# Patient Record
Sex: FEMALE | Race: ASIAN | NOT HISPANIC OR LATINO | Employment: FULL TIME | URBAN - METROPOLITAN AREA
[De-identification: names, ages, dates, MRNs, and addresses within clinical notes are randomized per-mention and may not be internally consistent; named-entity substitution may affect disease eponyms.]

---

## 2021-05-26 ENCOUNTER — RECORDS - HEALTHEAST (OUTPATIENT)
Dept: ADMINISTRATIVE | Facility: CLINIC | Age: 31
End: 2021-05-26

## 2023-07-12 ENCOUNTER — HOSPITAL ENCOUNTER (EMERGENCY)
Facility: CLINIC | Age: 33
Discharge: HOME | End: 2023-07-12
Attending: EMERGENCY MEDICINE | Admitting: EMERGENCY MEDICINE
Payer: COMMERCIAL

## 2023-07-12 VITALS
HEIGHT: 60 IN | DIASTOLIC BLOOD PRESSURE: 73 MMHG | WEIGHT: 230 LBS | OXYGEN SATURATION: 99 % | BODY MASS INDEX: 45.16 KG/M2 | RESPIRATION RATE: 28 BRPM | HEART RATE: 96 BPM | SYSTOLIC BLOOD PRESSURE: 128 MMHG | TEMPERATURE: 98.1 F

## 2023-07-12 DIAGNOSIS — R00.0 TACHYCARDIA: ICD-10-CM

## 2023-07-12 DIAGNOSIS — R00.2 PALPITATIONS: ICD-10-CM

## 2023-07-12 LAB
ALBUMIN SERPL-MCNC: 4.4 G/DL (ref 3.5–5)
ALBUMIN UR-MCNC: 30 MG/DL
ALP SERPL-CCNC: 74 U/L (ref 45–120)
ALT SERPL W P-5'-P-CCNC: 33 U/L (ref 0–45)
ANION GAP SERPL CALCULATED.3IONS-SCNC: 14 MMOL/L (ref 5–18)
APPEARANCE UR: CLEAR
AST SERPL W P-5'-P-CCNC: 24 U/L (ref 0–40)
BASOPHILS # BLD AUTO: 0.1 10E3/UL (ref 0–0.2)
BASOPHILS NFR BLD AUTO: 1 %
BILIRUB SERPL-MCNC: 0.6 MG/DL (ref 0–1)
BILIRUB UR QL STRIP: NEGATIVE
BUN SERPL-MCNC: 19 MG/DL (ref 8–22)
CALCIUM SERPL-MCNC: 10.4 MG/DL (ref 8.5–10.5)
CHLORIDE BLD-SCNC: 101 MMOL/L (ref 98–107)
CO2 SERPL-SCNC: 23 MMOL/L (ref 22–31)
COLOR UR AUTO: ABNORMAL
CREAT SERPL-MCNC: 0.76 MG/DL (ref 0.6–1.1)
D DIMER PPP FEU-MCNC: 0.3 UG/ML FEU (ref 0–0.5)
EOSINOPHIL # BLD AUTO: 0.3 10E3/UL (ref 0–0.7)
EOSINOPHIL NFR BLD AUTO: 2 %
ERYTHROCYTE [DISTWIDTH] IN BLOOD BY AUTOMATED COUNT: 14.5 % (ref 10–15)
FLUAV RNA SPEC QL NAA+PROBE: NEGATIVE
FLUBV RNA RESP QL NAA+PROBE: NEGATIVE
GFR SERPL CREATININE-BSD FRML MDRD: >90 ML/MIN/1.73M2
GLUCOSE BLD-MCNC: 85 MG/DL (ref 70–125)
GLUCOSE UR STRIP-MCNC: NEGATIVE MG/DL
HCG UR QL: NEGATIVE
HCT VFR BLD AUTO: 45 % (ref 35–47)
HGB BLD-MCNC: 14.8 G/DL (ref 11.7–15.7)
HGB UR QL STRIP: ABNORMAL
IMM GRANULOCYTES # BLD: 0.1 10E3/UL
IMM GRANULOCYTES NFR BLD: 1 %
KETONES UR STRIP-MCNC: NEGATIVE MG/DL
LEUKOCYTE ESTERASE UR QL STRIP: ABNORMAL
LYMPHOCYTES # BLD AUTO: 3 10E3/UL (ref 0.8–5.3)
LYMPHOCYTES NFR BLD AUTO: 24 %
MAGNESIUM SERPL-MCNC: 1.6 MG/DL (ref 1.8–2.6)
MCH RBC QN AUTO: 26.3 PG (ref 26.5–33)
MCHC RBC AUTO-ENTMCNC: 32.9 G/DL (ref 31.5–36.5)
MCV RBC AUTO: 80 FL (ref 78–100)
MONOCYTES # BLD AUTO: 0.8 10E3/UL (ref 0–1.3)
MONOCYTES NFR BLD AUTO: 6 %
MUCOUS THREADS #/AREA URNS LPF: PRESENT /LPF
NEUTROPHILS # BLD AUTO: 8.3 10E3/UL (ref 1.6–8.3)
NEUTROPHILS NFR BLD AUTO: 66 %
NITRATE UR QL: NEGATIVE
NRBC # BLD AUTO: 0 10E3/UL
NRBC BLD AUTO-RTO: 0 /100
PH UR STRIP: 5.5 [PH] (ref 5–7)
PLATELET # BLD AUTO: 301 10E3/UL (ref 150–450)
POTASSIUM BLD-SCNC: 3.9 MMOL/L (ref 3.5–5)
PROT SERPL-MCNC: 8.8 G/DL (ref 6–8)
RBC # BLD AUTO: 5.62 10E6/UL (ref 3.8–5.2)
RBC URINE: 1 /HPF
RSV RNA SPEC NAA+PROBE: NEGATIVE
SARS-COV-2 RNA RESP QL NAA+PROBE: NEGATIVE
SODIUM SERPL-SCNC: 138 MMOL/L (ref 136–145)
SP GR UR STRIP: 1.02 (ref 1–1.03)
SQUAMOUS EPITHELIAL: 3 /HPF
TROPONIN I SERPL-MCNC: 0.03 NG/ML (ref 0–0.29)
TSH SERPL DL<=0.005 MIU/L-ACNC: 3.64 UIU/ML (ref 0.3–5)
UROBILINOGEN UR STRIP-MCNC: <2 MG/DL
WBC # BLD AUTO: 12.5 10E3/UL (ref 4–11)
WBC URINE: 9 /HPF

## 2023-07-12 PROCEDURE — 84484 ASSAY OF TROPONIN QUANT: CPT | Performed by: EMERGENCY MEDICINE

## 2023-07-12 PROCEDURE — 80053 COMPREHEN METABOLIC PANEL: CPT | Performed by: EMERGENCY MEDICINE

## 2023-07-12 PROCEDURE — 81025 URINE PREGNANCY TEST: CPT | Performed by: EMERGENCY MEDICINE

## 2023-07-12 PROCEDURE — 96360 HYDRATION IV INFUSION INIT: CPT

## 2023-07-12 PROCEDURE — 93005 ELECTROCARDIOGRAM TRACING: CPT | Performed by: EMERGENCY MEDICINE

## 2023-07-12 PROCEDURE — 84443 ASSAY THYROID STIM HORMONE: CPT | Performed by: EMERGENCY MEDICINE

## 2023-07-12 PROCEDURE — 36415 COLL VENOUS BLD VENIPUNCTURE: CPT | Performed by: EMERGENCY MEDICINE

## 2023-07-12 PROCEDURE — 83735 ASSAY OF MAGNESIUM: CPT | Performed by: EMERGENCY MEDICINE

## 2023-07-12 PROCEDURE — 258N000003 HC RX IP 258 OP 636: Performed by: EMERGENCY MEDICINE

## 2023-07-12 PROCEDURE — 87637 SARSCOV2&INF A&B&RSV AMP PRB: CPT | Performed by: EMERGENCY MEDICINE

## 2023-07-12 PROCEDURE — 81001 URINALYSIS AUTO W/SCOPE: CPT | Performed by: EMERGENCY MEDICINE

## 2023-07-12 PROCEDURE — 85025 COMPLETE CBC W/AUTO DIFF WBC: CPT | Performed by: EMERGENCY MEDICINE

## 2023-07-12 PROCEDURE — 99284 EMERGENCY DEPT VISIT MOD MDM: CPT | Mod: 25

## 2023-07-12 PROCEDURE — 85379 FIBRIN DEGRADATION QUANT: CPT | Performed by: EMERGENCY MEDICINE

## 2023-07-12 PROCEDURE — 96361 HYDRATE IV INFUSION ADD-ON: CPT

## 2023-07-12 RX ADMIN — SODIUM CHLORIDE 1000 ML: 9 INJECTION, SOLUTION INTRAVENOUS at 20:48

## 2023-07-12 RX ADMIN — SODIUM CHLORIDE 1000 ML: 9 INJECTION, SOLUTION INTRAVENOUS at 20:15

## 2023-07-12 ASSESSMENT — ACTIVITIES OF DAILY LIVING (ADL): ADLS_ACUITY_SCORE: 35

## 2023-07-12 NOTE — ED TRIAGE NOTES
Pt was seen in clinic today for a rapid HR for the last couple of days with some headache and dizziness. Had EKG in clinic showing ST but was told to come to ED for further eval.     Triage Assessment     Row Name 07/12/23 6114       Triage Assessment (Adult)    Airway WDL WDL       Respiratory WDL    Respiratory WDL WDL       Skin Circulation/Temperature WDL    Skin Circulation/Temperature WDL WDL       Cardiac WDL    Cardiac WDL X;rhythm    Pulse Rate & Regularity tachycardic       Peripheral/Neurovascular WDL    Peripheral Neurovascular WDL WDL       Cognitive/Neuro/Behavioral WDL    Cognitive/Neuro/Behavioral WDL WDL

## 2023-07-13 LAB
ATRIAL RATE - MUSE: 111 BPM
DIASTOLIC BLOOD PRESSURE - MUSE: NORMAL MMHG
INTERPRETATION ECG - MUSE: NORMAL
P AXIS - MUSE: 43 DEGREES
PR INTERVAL - MUSE: 136 MS
QRS DURATION - MUSE: 94 MS
QT - MUSE: 362 MS
QTC - MUSE: 492 MS
R AXIS - MUSE: 32 DEGREES
SYSTOLIC BLOOD PRESSURE - MUSE: NORMAL MMHG
T AXIS - MUSE: 2 DEGREES
VENTRICULAR RATE- MUSE: 111 BPM

## 2023-07-13 NOTE — DISCHARGE INSTRUCTIONS
You were seen in the emergency department for rapid heart rate and palpitations.  Your evaluation included lab testing here which has looked generally stable and reassuring.  Specifically we did not see any signs of blood clots, electrolyte problems, heart attack or arrhythmia.  We did note some improvement in your heart rate with IV fluids here and would recommend continuing to try to stay well-hydrated at home and drink at least 2 to 3 L of water or other balanced electrolyte solutions like sugar-free Gatorade to stay well-hydrated.  We would like you to follow-up in your clinic to continue reviewing things with your primary provider and discuss any additional work-up needed for persistent symptoms after this ED visit.

## 2023-07-13 NOTE — ED PROVIDER NOTES
EMERGENCY DEPARTMENT ENCOUNTER      NAME: Katina Montilla  AGE: 33 year old female  YOB: 1990  MRN: [id number]  EVALUATION DATE & TIME: 2023  7:12 PM    PCP: Jacqui Dc    ED PROVIDER: Avinash Gutierrez M.D.      Chief Complaint   Patient presents with     Tachycardia         FINAL IMPRESSION:  1. Tachycardia    2. Palpitations          ED COURSE & MEDICAL DECISION MAKIN:01 PM I met with the patient to obtain patient history and performed a physical exam. Discussed plan for ED work up including potential diagnostic studies and interventions.  9:33 PM Reevaluated and updated the patient with findings. We discussed the plan for discharge and the patient is agreeable. Reviewed supportive cares, symptomatic treatment, outpatient follow up, and reasons to return to the Emergency Department. Patient to be discharged by ED RN.     33 year old female presents to the Emergency Department for evaluation of tachycardia, palpitations.  She is mildly tachycardic but otherwise vitally stable when she arrives to the emergency department.  She has an EKG which confirms sinus rhythm.  Broad lab work-up was initiated as below.  This included a reassuringly normal D-dimer ruling out pulmonary embolism.  Infectious work-up was undertaken which did reveal a mild leukocytosis but otherwise she does not have any respiratory complaints, has a negative COVID test, no convincing evidence for UTI with minimal pyuria on a contaminated specimen and no urinary symptoms.  She does not have any abdominal pain complaints vomiting or other acute infectious symptoms.  Remainder of her work-up including metabolic profile, TSH are unrevealing.  Patient received some IV fluids and her heart rate subsequently improved modestly and now is within the normal range.  We discussed increasing liquid intake and other supportive measures for home but at this point I do not think would require hospitalization.  Clinic follow-up was  "advised within 1 to 2 weeks especially if any persistent concerns.  Return precautions were reviewed and patient was discharged in stable condition.      Medical Decision Making    History:    Supplemental history from: Documented in chart, if applicable    External Record(s) reviewed: Documented in chart, if applicable.    Work Up:    Chart documentation includes differential considered and any EKGs or imaging independently interpreted by provider, where specified.    In additional to work up documented, I considered the following work up: Documented in chart, if applicable.    External consultation:    Discussion of management with another provider: Documented in chart, if applicable    Complicating factors:    Care impacted by chronic illness: Hypertension    Care affected by social determinants of health: N/A    Disposition considerations: Discharge. No recommendations on prescription strength medication(s). See documentation for any additional details.            MEDICATIONS GIVEN IN THE EMERGENCY:  Medications   0.9% sodium chloride BOLUS (0 mLs Intravenous Stopped 7/12/23 2134)   0.9% sodium chloride BOLUS (0 mLs Intravenous Stopped 7/12/23 2134)       NEW PRESCRIPTIONS STARTED AT TODAY'S ER VISIT  There are no discharge medications for this patient.         =================================================================    HPI    Patient information was obtained from: patient    Use of : N/A        Katina Montilla is a 33 year old female with a pertinent history of HTN who presents to this ED via self walk-in for evaluation of palpitations.    Patient reports she developed some persistent palpitations yesterday. She describes the palpitations as her heart \"adding a beat.\" Today, the palpitations have been constant so she decided to go to  to be evaluated.  did an ECG on her and recommended she come into ED to be evaluated. She associates some light headedness and dizziness currently. She notes she " "had a \"bad\" headache yesterday which has now resolved after taking 2 tablets of Excedrin yesterday.  also notes she was \"warm yesterday.\"    She otherwise denies associating changes in activity, fever, chest pain, abdominal pain, cough, shortness of breath, dysuria, hematuria, and difficulty urinating. She is currently taking her HTN medications. She endorses history of echocardiogram and says she had \"2 acute leaky valves\" then.    There were no other concerns/complaints at this time.     REVIEW OF SYSTEMS   All systems reviewed and negative except as noted in HPI.    PAST MEDICAL HISTORY:  History reviewed. No pertinent past medical history.    PAST SURGICAL HISTORY:  History reviewed. No pertinent surgical history.        CURRENT MEDICATIONS:    No current facility-administered medications for this encounter.     No current outpatient medications on file.         ALLERGIES:  No Known Allergies    FAMILY HISTORY:  History reviewed. No pertinent family history.    SOCIAL HISTORY:   Social History     Socioeconomic History     Marital status: Single       VITALS:  /73   Pulse 96   Temp 98.1  F (36.7  C) (Oral)   Resp 28   Ht 1.524 m (5')   Wt 104.3 kg (230 lb)   LMP 04/03/2023   SpO2 99%   BMI 44.92 kg/m      PHYSICAL EXAM    Constitutional: Well developed, Well nourished, NAD.  HENT: Normocephalic, Atraumatic. Neck Supple.  Eyes: EOMI, Conjunctiva normal.  Respiratory: Breathing comfortably on room air. Speaks full sentences easily. Lungs clear to ascultation.  Cardiovascular: Borderline tachycardic heart rate, Regular rhythm. No peripheral edema.  Abdomen: Soft, nontender  Musculoskeletal: Good range of motion in all major joints. No major deformities noted.  Integument: Warm, Dry.  Neurologic: Alert & awake, Normal motor function, Normal sensory function, No focal deficits noted.   Psychiatric: Cooperative. Affect appropriate.     LAB:  All pertinent labs reviewed and interpreted.  Labs " Ordered and Resulted from Time of ED Arrival to Time of ED Departure   COMPREHENSIVE METABOLIC PANEL - Abnormal       Result Value    Sodium 138      Potassium 3.9      Chloride 101      Carbon Dioxide (CO2) 23      Anion Gap 14      Urea Nitrogen 19      Creatinine 0.76      Calcium 10.4      Glucose 85      Alkaline Phosphatase 74      AST 24      ALT 33      Protein Total 8.8 (*)     Albumin 4.4      Bilirubin Total 0.6      GFR Estimate >90     MAGNESIUM - Abnormal    Magnesium 1.6 (*)    CBC WITH PLATELETS AND DIFFERENTIAL - Abnormal    WBC Count 12.5 (*)     RBC Count 5.62 (*)     Hemoglobin 14.8      Hematocrit 45.0      MCV 80      MCH 26.3 (*)     MCHC 32.9      RDW 14.5      Platelet Count 301      % Neutrophils 66      % Lymphocytes 24      % Monocytes 6      % Eosinophils 2      % Basophils 1      % Immature Granulocytes 1      NRBCs per 100 WBC 0      Absolute Neutrophils 8.3      Absolute Lymphocytes 3.0      Absolute Monocytes 0.8      Absolute Eosinophils 0.3      Absolute Basophils 0.1      Absolute Immature Granulocytes 0.1      Absolute NRBCs 0.0     ROUTINE UA WITH MICROSCOPIC REFLEX TO CULTURE - Abnormal    Color Urine Light Yellow      Appearance Urine Clear      Glucose Urine Negative      Bilirubin Urine Negative      Ketones Urine Negative      Specific Gravity Urine 1.017      Blood Urine 0.06 mg/dL (*)     pH Urine 5.5      Protein Albumin Urine 30 (*)     Urobilinogen Urine <2.0      Nitrite Urine Negative      Leukocyte Esterase Urine 75 Luciano/uL (*)     Mucus Urine Present (*)     RBC Urine 1      WBC Urine 9 (*)     Squamous Epithelials Urine 3 (*)    TROPONIN I - Normal    Troponin I 0.03     TSH WITH FREE T4 REFLEX - Normal    TSH 3.64     D DIMER QUANTITATIVE - Normal    D-Dimer Quantitative 0.30     HCG QUALITATIVE URINE - Normal    hCG Urine Qualitative Negative     INFLUENZA A/B, RSV, & SARS-COV2 PCR - Normal    Influenza A PCR Negative      Influenza B PCR Negative      RSV PCR  Negative      SARS CoV2 PCR Negative         RADIOLOGY:  Reviewed all pertinent imaging. Please see official radiology report.  No orders to display       EKG:    Performed at: 1758    Impression: Sinus tachycardia, possible left atrial enlargement, borderline voltage criteria for LVH and nonspecific repolarization disturbance    Rate: 111  Rhythm: Sinus  Axis: Normal  ND Interval: 136  QRS Interval: 94  QTc Interval: 492  ST Changes: Nonspecific ST-T wave abnormality  Comparison: None available for direct comparison    I have independently reviewed and interpreted the EKG(s) documented above.      I, Migdalai Preciado, am serving as a scribe to document services personally performed by Dr. Avinash Gutierrez, based on my observation and the provider's statements to me. I, Avinash Gutierrez MD attest that Migdalia Preciado is acting in a scribe capacity, has observed my performance of the services and has documented them in accordance with my direction.    Avinash Gutierrez M.D.  Emergency Medicine  [facility] EMERGENCY ROOM  [address] [id number] [phone]  Dept: [phone]     Avinash Gutierrez MD  07/13/23 2277

## 2024-01-08 ENCOUNTER — HOSPITAL ENCOUNTER (EMERGENCY)
Facility: HOSPITAL | Age: 34
Discharge: HOME | End: 2024-01-08
Attending: EMERGENCY MEDICINE | Admitting: EMERGENCY MEDICINE
Payer: COMMERCIAL

## 2024-01-08 VITALS
HEIGHT: 60 IN | TEMPERATURE: 98.3 F | WEIGHT: 232 LBS | BODY MASS INDEX: 45.55 KG/M2 | OXYGEN SATURATION: 98 % | RESPIRATION RATE: 26 BRPM | HEART RATE: 85 BPM | DIASTOLIC BLOOD PRESSURE: 74 MMHG | SYSTOLIC BLOOD PRESSURE: 133 MMHG

## 2024-01-08 DIAGNOSIS — R06.00 DYSPNEA, UNSPECIFIED TYPE: ICD-10-CM

## 2024-01-08 DIAGNOSIS — Z32.01 PREGNANCY TEST POSITIVE: ICD-10-CM

## 2024-01-08 LAB
ANION GAP SERPL CALCULATED.3IONS-SCNC: 12 MMOL/L (ref 7–15)
ATRIAL RATE - MUSE: 97 BPM
BUN SERPL-MCNC: 10.3 MG/DL (ref 6–20)
CALCIUM SERPL-MCNC: 9.1 MG/DL (ref 8.6–10)
CHLORIDE SERPL-SCNC: 102 MMOL/L (ref 98–107)
CREAT SERPL-MCNC: 0.56 MG/DL (ref 0.51–0.95)
DEPRECATED HCO3 PLAS-SCNC: 23 MMOL/L (ref 22–29)
DIASTOLIC BLOOD PRESSURE - MUSE: 85 MMHG
EGFRCR SERPLBLD CKD-EPI 2021: >90 ML/MIN/1.73M2
ERYTHROCYTE [DISTWIDTH] IN BLOOD BY AUTOMATED COUNT: 13.6 % (ref 10–15)
FLUAV RNA SPEC QL NAA+PROBE: NEGATIVE
FLUBV RNA RESP QL NAA+PROBE: NEGATIVE
GLUCOSE SERPL-MCNC: 108 MG/DL (ref 70–99)
HCG INTACT+B SERPL-ACNC: 7018 MIU/ML
HCT VFR BLD AUTO: 40.8 % (ref 35–47)
HGB BLD-MCNC: 13.6 G/DL (ref 11.7–15.7)
HOLD SPECIMEN: NORMAL
INTERPRETATION ECG - MUSE: NORMAL
MCH RBC QN AUTO: 27.4 PG (ref 26.5–33)
MCHC RBC AUTO-ENTMCNC: 33.3 G/DL (ref 31.5–36.5)
MCV RBC AUTO: 82 FL (ref 78–100)
P AXIS - MUSE: 59 DEGREES
PLATELET # BLD AUTO: 252 10E3/UL (ref 150–450)
POTASSIUM SERPL-SCNC: 3.4 MMOL/L (ref 3.4–5.3)
PR INTERVAL - MUSE: 134 MS
QRS DURATION - MUSE: 94 MS
QT - MUSE: 392 MS
QTC - MUSE: 497 MS
R AXIS - MUSE: 70 DEGREES
RBC # BLD AUTO: 4.97 10E6/UL (ref 3.8–5.2)
RSV RNA SPEC NAA+PROBE: NEGATIVE
SARS-COV-2 RNA RESP QL NAA+PROBE: NEGATIVE
SODIUM SERPL-SCNC: 137 MMOL/L (ref 135–145)
SYSTOLIC BLOOD PRESSURE - MUSE: 133 MMHG
T AXIS - MUSE: 30 DEGREES
VENTRICULAR RATE- MUSE: 97 BPM
WBC # BLD AUTO: 8.9 10E3/UL (ref 4–11)

## 2024-01-08 PROCEDURE — 85014 HEMATOCRIT: CPT | Performed by: EMERGENCY MEDICINE

## 2024-01-08 PROCEDURE — 84702 CHORIONIC GONADOTROPIN TEST: CPT | Performed by: EMERGENCY MEDICINE

## 2024-01-08 PROCEDURE — 36415 COLL VENOUS BLD VENIPUNCTURE: CPT | Performed by: EMERGENCY MEDICINE

## 2024-01-08 PROCEDURE — 80048 BASIC METABOLIC PNL TOTAL CA: CPT | Performed by: EMERGENCY MEDICINE

## 2024-01-08 PROCEDURE — 87637 SARSCOV2&INF A&B&RSV AMP PRB: CPT | Performed by: EMERGENCY MEDICINE

## 2024-01-08 PROCEDURE — 99284 EMERGENCY DEPT VISIT MOD MDM: CPT

## 2024-01-08 PROCEDURE — 93005 ELECTROCARDIOGRAM TRACING: CPT | Performed by: STUDENT IN AN ORGANIZED HEALTH CARE EDUCATION/TRAINING PROGRAM

## 2024-01-08 ASSESSMENT — ACTIVITIES OF DAILY LIVING (ADL): ADLS_ACUITY_SCORE: 35

## 2024-01-08 NOTE — ED TRIAGE NOTES
Patient in here with multiple complaints c/o shortness of breath , chest pain last night, headaches , feeling tired, light head and lower abdominal pain.  Patient stated tested positive pregnancy 1/6/2024.  Hx; HTN, pre diabetis, Asthma.  Patient did not taking Bp med this morning.  Patient had bloody stool mid   December and spotting.      Triage Assessment (Adult)       Row Name 01/08/24 0943          Triage Assessment    Airway WDL WDL        Respiratory WDL    Respiratory WDL all     Rhythm/Pattern, Respiratory shortness of breath        Skin Circulation/Temperature WDL    Skin Circulation/Temperature WDL WDL        Peripheral/Neurovascular WDL    Peripheral Neurovascular WDL WDL        Cognitive/Neuro/Behavioral WDL    Cognitive/Neuro/Behavioral WDL WDL

## 2024-01-08 NOTE — ED PROVIDER NOTES
EMERGENCY DEPARTMENT ENCOUNTER      NAME: Katina Montilla  AGE: 33 year old female  YOB: 1990  MRN: [id number]  EVALUATION DATE & TIME: 1/8/2024  9:49 AM    PCP: Jacqui Dc    ED PROVIDER: Blane Cortez M.D.      Chief Complaint   Patient presents with    Chest Pain    Shortness of Breath    Abdominal Pain         FINAL IMPRESSION:  Dyspnea  Pregnancy    ED COURSE & MEDICAL DECISION MAKING:    Pertinent Labs & Imaging studies reviewed. (See chart for details)  33 year old female presents to the Emergency Department for evaluation of shortness of breath.  States been ongoing intermittently for a number of days.  Has been using her inhaler rarely without improvement.  Is concerned about possibility of complications of pregnancy.  Patient with very irregular menstrual cycles.  Was testing for pregnancy in November and December is negative.  Did test positive a few days ago.  Patient denies any fevers chills.  Denies any cough.  Does take metformin for elevated blood sugars but does not check her sugars.  On exam she is an obese female mild distress.  Vital signs unremarkable.  Oxygenation excellent.  Lungs clear.  No wheezing or rhonchi.  Card exam unremarkable.  Abdomen soft obese nontender.  Patient with potential issues with hyperglycemia, electrolyte imbalance given metformin use and absence of blood sugar testing.  The possibility of RSV/COVID/influenza also considered.  Will obtain quantitative beta-hCG also to stage her pregnancy.  No indications for imaging at this point.. Patient appears non toxic with stable vitals signs. Overall exam is benign.          10:25 AM.  I met with the patient for the initial interview and physical examination. Discussed plan for treatment and workup in the ED.    12 PM.  Laboratory evaluation reassuring.  Electrolytes essentially normal.  Minimal hyperglycemia glucose of 108.  CBC unremarkable.  Quantitative hCG elevated at 7018 consistent with her pregnancy.   Swab negative for RSV/COVID/influenza.  Patient reassured in regards to finding.  Encouraged to establish routine OB/GYN care.  Caution to avoid cold as this could exacerbate her asthma.  At the conclusion of the encounter I discussed the results of all of the tests and the disposition. The questions were answered and return precautions provided. The patient or family acknowledged understanding and was agreeable with the care plan.       PPE: Provider paper mask  MEDICATIONS GIVEN IN THE EMERGENCY:  Medications - No data to display    NEW PRESCRIPTIONS STARTED AT TODAY'S ER VISIT  New Prescriptions    No medications on file          =================================================================    HPI        Katina Montilla is a 33 year old female with a pertient medical history of obesity, irregular menstrual cycle, hyperglycemia who presents to the ED for evaluation of dyspnea.  Symptoms ongoing for the last several days.  Does not appear to be related to activities.  Has been using her inhaler without obvious improvement.  No obvious fevers.  Minimal cough.  No other family members ill.  Patient with irregular menstrual cycles and did have a recent positive pregnancy test.  Reports negative pregnancy test in both November and December.      REVIEW OF SYSTEMS   Constitutional:  Denies fever, chills  Respiratory: Minimal cough and increased work of breathing  Cardiovascular:  Denies chest pain, palpitations  GI:  Denies abdominal pain, nausea, vomiting, or change in bowel or bladder habits   Musculoskeletal:  Denies any new muscle/joint swelling  Skin:  Denies rash   Neurologic:  Denies focal weakness  All systems negative except as marked.     PAST MEDICAL HISTORY:  History reviewed. No pertinent past medical history.    PAST SURGICAL HISTORY:  History reviewed. No pertinent surgical history.      CURRENT MEDICATIONS:    No current facility-administered medications for this encounter.  No current outpatient  medications on file.    ALLERGIES:  No Known Allergies    FAMILY HISTORY:  History reviewed. No pertinent family history.    SOCIAL HISTORY:   Social History     Socioeconomic History    Marital status: Single     Spouse name: None    Number of children: None    Years of education: None    Highest education level: None       VITALS:  Patient Vitals for the past 24 hrs:   BP Temp Temp src Pulse Resp SpO2 Height Weight   01/08/24 1015 127/83 -- -- 91 27 98 % -- --   01/08/24 1000 133/85 -- -- 91 28 99 % -- --   01/08/24 0952 132/84 -- -- -- -- -- -- --   01/08/24 0948 (!) 193/112 -- -- -- -- -- -- --   01/08/24 0939 (!) 154/87 98.3  F (36.8  C) Oral 96 18 99 % 1.524 m (5') 105.2 kg (232 lb)        PHYSICAL EXAM    Constitutional:  Awake, alert, in minimal apparent distress  HENT:  Normocephalic, Atraumatic. Bilateral external ears normal. Oropharynx moist. Nose normal. Neck- Normal range of motion with no guarding,  Supple, No stridor.   Eyes:  PERRL, EOMI with no signs of entrapment, Conjunctiva normal, No discharge.   Respiratory:  Normal breath sounds, No respiratory distress, No wheezing.    Cardiovascular:  Normal heart rate, Normal rhythm, No appreciable rubs or gallops.   GI:  Soft, No tenderness, No distension, No palpable masses  Musculoskeletal:No edema. Good range of motion in all major joints. No tenderness to palpation or major deformities noted.  Integument:  Warm, Dry, No erythema, No rash.   Neurologic:  Alert & oriented, Normal motor function, Normal sensory function, No focal deficits noted.   Psychiatric:  Affect normal, Judgment normal, Mood normal.     LAB:  All pertinent labs reviewed and interpreted.     Results for orders placed or performed during the hospital encounter of 01/08/24   [facility] Draw     Status: None    Narrative    The following orders were created for panel order [facility] Draw.  Procedure                               Abnormality         Status                     ---------                                -----------         ------                     Extra Blue Top Tube[[id number]]                              Final result               Extra Red Top Tube[[id number]]                               Final result               Extra Green Top (Lithium...[[id number]]                      Final result               Extra Purple Top Tube[[id number]]                            Final result               Extra Purple Top Tube[[id number]]                            Final result                 Please view results for these tests on the individual orders.   Extra Blue Top Tube     Status: None   Result Value Ref Range    Hold Specimen JIC    Extra Red Top Tube     Status: None   Result Value Ref Range    Hold Specimen JIC    Extra Green Top (Lithium Heparin) Tube     Status: None   Result Value Ref Range    Hold Specimen JIC    Extra Purple Top Tube     Status: None   Result Value Ref Range    Hold Specimen JIC    Extra Purple Top Tube     Status: None   Result Value Ref Range    Hold Specimen JIC    Basic metabolic panel     Status: Abnormal   Result Value Ref Range    Sodium 137 135 - 145 mmol/L    Potassium 3.4 3.4 - 5.3 mmol/L    Chloride 102 98 - 107 mmol/L    Carbon Dioxide (CO2) 23 22 - 29 mmol/L    Anion Gap 12 7 - 15 mmol/L    Urea Nitrogen 10.3 6.0 - 20.0 mg/dL    Creatinine 0.56 0.51 - 0.95 mg/dL    GFR Estimate >90 >60 mL/min/1.73m2    Calcium 9.1 8.6 - 10.0 mg/dL    Glucose 108 (H) 70 - 99 mg/dL   CBC (+ platelets, no diff)     Status: Normal   Result Value Ref Range    WBC Count 8.9 4.0 - 11.0 10e3/uL    RBC Count 4.97 3.80 - 5.20 10e6/uL    Hemoglobin 13.6 11.7 - 15.7 g/dL    Hematocrit 40.8 35.0 - 47.0 %    MCV 82 78 - 100 fL    MCH 27.4 26.5 - 33.0 pg    MCHC 33.3 31.5 - 36.5 g/dL    RDW 13.6 10.0 - 15.0 %    Platelet Count 252 150 - 450 10e3/uL   HCG quantitative pregnancy (blood)     Status: Abnormal   Result Value Ref Range    hCG Quantitative 7,018 (H) <5 mIU/mL   Symptomatic Influenza  A/B, RSV, & SARS-CoV2 PCR (COVID-19) Nasopharyngeal     Status: Normal    Specimen: Nasopharyngeal; Swab   Result Value Ref Range    Influenza A PCR Negative Negative    Influenza B PCR Negative Negative    RSV PCR Negative Negative    SARS CoV2 PCR Negative Negative    Narrative    Testing was performed using the Xpert Xpress CoV2/Flu/RSV Assay on the [employer] GeneXpert Instrument. This test should be ordered for the detection of SARS-CoV-2, influenza, and RSV viruses in individuals who meet clinical and/or epidemiological criteria. Test performance is unknown in asymptomatic patients. This test is for in vitro diagnostic use under the FDA EUA for laboratories certified under CLIA to perform high or moderate complexity testing. This test has not been FDA cleared or approved. A negative result does not rule out the presence of PCR inhibitors in the specimen or target RNA in concentration below the limit of detection for the assay. If only one viral target is positive but coinfection with multiple targets is suspected, the sample should be re-tested with another FDA cleared, approved, or authorized test, if coinfection would change clinical management. This test was validated by the [facility]. These laboratories are certified under the Clinical Laboratory Improvement Amendments of 1988 (CLIA-88) as qualified to perform high complexity laboratory testing.        ECG: Normal sinus rhythm.  Rate of 97.  Prolonged QT at 392 ms.  Normal QRS.  Normal ST segments.  Unchanged compared to July 12, 2023    I have independently reviewed and interpreted the EKG    Blane Cortez M.D.  Emergency Medicine  [facility] EMERGENCY DEPARTMENT     Blane Cortez MD  01/08/24 1203       Blane Cortez MD  01/08/24 1228

## 2024-04-01 ENCOUNTER — HOSPITAL ENCOUNTER (EMERGENCY)
Facility: HOSPITAL | Age: 34
Discharge: HOME | End: 2024-04-01
Attending: STUDENT IN AN ORGANIZED HEALTH CARE EDUCATION/TRAINING PROGRAM | Admitting: STUDENT IN AN ORGANIZED HEALTH CARE EDUCATION/TRAINING PROGRAM
Payer: COMMERCIAL

## 2024-04-01 VITALS
RESPIRATION RATE: 18 BRPM | HEART RATE: 92 BPM | WEIGHT: 238 LBS | SYSTOLIC BLOOD PRESSURE: 147 MMHG | OXYGEN SATURATION: 96 % | BODY MASS INDEX: 46.48 KG/M2 | DIASTOLIC BLOOD PRESSURE: 70 MMHG | TEMPERATURE: 97.9 F

## 2024-04-01 DIAGNOSIS — R51.9 NONINTRACTABLE HEADACHE, UNSPECIFIED CHRONICITY PATTERN, UNSPECIFIED HEADACHE TYPE: ICD-10-CM

## 2024-04-01 LAB
ALBUMIN SERPL BCG-MCNC: 3.8 G/DL (ref 3.5–5.2)
ALBUMIN UR-MCNC: 200 MG/DL
ALP SERPL-CCNC: 57 U/L (ref 40–150)
ALT SERPL W P-5'-P-CCNC: 17 U/L (ref 0–50)
ANION GAP SERPL CALCULATED.3IONS-SCNC: 13 MMOL/L (ref 7–15)
APPEARANCE UR: CLEAR
AST SERPL W P-5'-P-CCNC: 27 U/L (ref 0–45)
BASOPHILS # BLD AUTO: 0 10E3/UL (ref 0–0.2)
BASOPHILS NFR BLD AUTO: 0 %
BILIRUB DIRECT SERPL-MCNC: <0.2 MG/DL (ref 0–0.3)
BILIRUB SERPL-MCNC: 0.4 MG/DL
BILIRUB UR QL STRIP: NEGATIVE
BUN SERPL-MCNC: 9.1 MG/DL (ref 6–20)
CALCIUM SERPL-MCNC: 9.5 MG/DL (ref 8.6–10)
CHLORIDE SERPL-SCNC: 103 MMOL/L (ref 98–107)
COLOR UR AUTO: ABNORMAL
CREAT SERPL-MCNC: 0.51 MG/DL (ref 0.51–0.95)
DEPRECATED HCO3 PLAS-SCNC: 22 MMOL/L (ref 22–29)
EGFRCR SERPLBLD CKD-EPI 2021: >90 ML/MIN/1.73M2
EOSINOPHIL # BLD AUTO: 0.2 10E3/UL (ref 0–0.7)
EOSINOPHIL NFR BLD AUTO: 1 %
ERYTHROCYTE [DISTWIDTH] IN BLOOD BY AUTOMATED COUNT: 14.3 % (ref 10–15)
GLUCOSE SERPL-MCNC: 83 MG/DL (ref 70–99)
GLUCOSE UR STRIP-MCNC: NEGATIVE MG/DL
HCT VFR BLD AUTO: 41.2 % (ref 35–47)
HGB BLD-MCNC: 13.2 G/DL (ref 11.7–15.7)
HGB UR QL STRIP: ABNORMAL
HYALINE CASTS: 2 /LPF
IMM GRANULOCYTES # BLD: 0.1 10E3/UL
IMM GRANULOCYTES NFR BLD: 1 %
KETONES UR STRIP-MCNC: 20 MG/DL
LEUKOCYTE ESTERASE UR QL STRIP: ABNORMAL
LYMPHOCYTES # BLD AUTO: 2.1 10E3/UL (ref 0.8–5.3)
LYMPHOCYTES NFR BLD AUTO: 16 %
MCH RBC QN AUTO: 26.1 PG (ref 26.5–33)
MCHC RBC AUTO-ENTMCNC: 32 G/DL (ref 31.5–36.5)
MCV RBC AUTO: 82 FL (ref 78–100)
MONOCYTES # BLD AUTO: 0.8 10E3/UL (ref 0–1.3)
MONOCYTES NFR BLD AUTO: 6 %
MUCOUS THREADS #/AREA URNS LPF: PRESENT /LPF
NEUTROPHILS # BLD AUTO: 9.6 10E3/UL (ref 1.6–8.3)
NEUTROPHILS NFR BLD AUTO: 76 %
NITRATE UR QL: NEGATIVE
NRBC # BLD AUTO: 0 10E3/UL
NRBC BLD AUTO-RTO: 0 /100
PH UR STRIP: 6.5 [PH] (ref 5–7)
PLATELET # BLD AUTO: 257 10E3/UL (ref 150–450)
POTASSIUM SERPL-SCNC: 4.2 MMOL/L (ref 3.4–5.3)
PROT SERPL-MCNC: 7.5 G/DL (ref 6.4–8.3)
RBC # BLD AUTO: 5.05 10E6/UL (ref 3.8–5.2)
RBC URINE: 1 /HPF
SODIUM SERPL-SCNC: 138 MMOL/L (ref 135–145)
SP GR UR STRIP: 1.02 (ref 1–1.03)
SQUAMOUS EPITHELIAL: 10 /HPF
UROBILINOGEN UR STRIP-MCNC: <2 MG/DL
WBC # BLD AUTO: 12.7 10E3/UL (ref 4–11)
WBC URINE: 3 /HPF

## 2024-04-01 PROCEDURE — 81001 URINALYSIS AUTO W/SCOPE: CPT | Performed by: EMERGENCY MEDICINE

## 2024-04-01 PROCEDURE — 258N000003 HC RX IP 258 OP 636: Performed by: EMERGENCY MEDICINE

## 2024-04-01 PROCEDURE — 87086 URINE CULTURE/COLONY COUNT: CPT | Performed by: EMERGENCY MEDICINE

## 2024-04-01 PROCEDURE — 250N000011 HC RX IP 250 OP 636: Mod: JZ | Performed by: EMERGENCY MEDICINE

## 2024-04-01 PROCEDURE — 80048 BASIC METABOLIC PNL TOTAL CA: CPT | Performed by: EMERGENCY MEDICINE

## 2024-04-01 PROCEDURE — 84155 ASSAY OF PROTEIN SERUM: CPT | Performed by: EMERGENCY MEDICINE

## 2024-04-01 PROCEDURE — 96374 THER/PROPH/DIAG INJ IV PUSH: CPT

## 2024-04-01 PROCEDURE — 36415 COLL VENOUS BLD VENIPUNCTURE: CPT | Performed by: EMERGENCY MEDICINE

## 2024-04-01 PROCEDURE — 250N000012 HC RX MED GY IP 250 OP 636 PS 637: Performed by: STUDENT IN AN ORGANIZED HEALTH CARE EDUCATION/TRAINING PROGRAM

## 2024-04-01 PROCEDURE — 96375 TX/PRO/DX INJ NEW DRUG ADDON: CPT

## 2024-04-01 PROCEDURE — 99284 EMERGENCY DEPT VISIT MOD MDM: CPT | Mod: 25

## 2024-04-01 PROCEDURE — 85025 COMPLETE CBC W/AUTO DIFF WBC: CPT | Performed by: EMERGENCY MEDICINE

## 2024-04-01 PROCEDURE — 250N000011 HC RX IP 250 OP 636: Performed by: STUDENT IN AN ORGANIZED HEALTH CARE EDUCATION/TRAINING PROGRAM

## 2024-04-01 PROCEDURE — 96361 HYDRATE IV INFUSION ADD-ON: CPT

## 2024-04-01 RX ORDER — DIPHENHYDRAMINE HYDROCHLORIDE 50 MG/ML
25 INJECTION INTRAMUSCULAR; INTRAVENOUS ONCE
Status: COMPLETED | OUTPATIENT
Start: 2024-04-01 | End: 2024-04-01

## 2024-04-01 RX ORDER — METOCLOPRAMIDE HYDROCHLORIDE 5 MG/ML
10 INJECTION INTRAMUSCULAR; INTRAVENOUS ONCE
Status: COMPLETED | OUTPATIENT
Start: 2024-04-01 | End: 2024-04-01

## 2024-04-01 RX ORDER — METOCLOPRAMIDE 10 MG/1
10 TABLET ORAL 4 TIMES DAILY PRN
Qty: 20 TABLET | Refills: 0 | Status: SHIPPED | OUTPATIENT
Start: 2024-04-01 | End: 2024-04-06

## 2024-04-01 RX ADMIN — DIPHENHYDRAMINE HYDROCHLORIDE 25 MG: 50 INJECTION, SOLUTION INTRAMUSCULAR; INTRAVENOUS at 18:56

## 2024-04-01 RX ADMIN — SODIUM CHLORIDE 1000 ML: 9 INJECTION, SOLUTION INTRAVENOUS at 17:46

## 2024-04-01 RX ADMIN — PROCHLORPERAZINE EDISYLATE 10 MG: 5 INJECTION INTRAMUSCULAR; INTRAVENOUS at 18:56

## 2024-04-01 RX ADMIN — DEXAMETHASONE 6 MG: 2 TABLET ORAL at 21:59

## 2024-04-01 RX ADMIN — METOCLOPRAMIDE 10 MG: 5 INJECTION, SOLUTION INTRAMUSCULAR; INTRAVENOUS at 17:46

## 2024-04-01 ASSESSMENT — ACTIVITIES OF DAILY LIVING (ADL)
ADLS_ACUITY_SCORE: 35

## 2024-04-01 ASSESSMENT — COLUMBIA-SUICIDE SEVERITY RATING SCALE - C-SSRS
1. IN THE PAST MONTH, HAVE YOU WISHED YOU WERE DEAD OR WISHED YOU COULD GO TO SLEEP AND NOT WAKE UP?: NO
2. HAVE YOU ACTUALLY HAD ANY THOUGHTS OF KILLING YOURSELF IN THE PAST MONTH?: NO
6. HAVE YOU EVER DONE ANYTHING, STARTED TO DO ANYTHING, OR PREPARED TO DO ANYTHING TO END YOUR LIFE?: NO

## 2024-04-01 NOTE — ED PROVIDER NOTES
EMERGENCY DEPARTMENT ENCOUNTER      NAME: Katina Montilla  AGE: 33 year old female  YOB: 1990  MRN: [id number]  EVALUATION DATE & TIME: 4/1/2024  5:13 PM    PCP: Jacqui Dc    ED PROVIDER: Baltazar Cardona MD      Chief Complaint   Patient presents with    Headache    Morning Sickness         FINAL IMPRESSION:  1. Nonintractable headache, unspecified chronicity pattern, unspecified headache type          ED COURSE & MEDICAL DECISION MAKING:    Pertinent Labs & Imaging studies reviewed. (See chart for details)  33 year old female presents to the Emergency Department for evaluation of headache and nausea and vomiting,     Patient is a 33-year-old female currently 18 weeks gestation presenting to the emergency department for evaluation of headache nausea and vomiting.  Headache started yesterday afternoon around 1 PM and has persisted since.  Describes the headache in her forehead and top of the head.  No associated visual changes or diplopia.  No neck stiffness.  No fevers.  Has had some mild photosensitivity.  Denies any chest pain or shortness of breath.  Had some nausea and vomiting as well as nonbloody nonbilious emesis.  Denies any urinary symptoms.  No vaginal bleeding or loss of clear fluid.  She has history of underlying hypertension and is currently on hydralazine for this.    On initial evaluation patient is markedly hypertensive but otherwise hemodynamically stable and afebrile.  She is awake alert and answering questions appropriately.  No facial droop.  Pupils equal round reactive to light and accommodating.  Symmetric strength in upper and lower extremities bilaterally.  Heart and lungs are clear without any wheezes or rails.  Abdomen soft and benign with uterine fundus palpable below the umbilicus.  No meningismus.    Although patient is hypertensive here she is only 18 weeks gestation and outside the window for preeclampsia.  Suspect hypertension is likely in response to her headache.  She  has no visual changes or altered mental status and lower suspicion for hypertensive encephalopathy.  Denies any chest pain or other symptoms of hypertensive emergency.  Will attempt symptomatic management of her headache at this point in time and get labs to evaluate for any metabolic derangements as result of her vomiting.    Labs reviewed and interpreted myself.  CBC shows a mild leukocytosis but no significant anemia.  BMP is reassuring with normal renal function.  LFTs also unremarkable.  Urinalysis likely contaminated as it shows numerous squamous cells but does not suggest urinary tract infection.    Patient received Reglan and Compazine and did have some modest improvement in her headache symptoms.  States that she feels comfortable discharge home at this point in time.  Although she does still have a mild ongoing headache I have a lower suspicion for acute intracranial cause such as subarachnoid hemorrhage or cerebral vein thrombosis.  To give the patient a dose of dexamethasone to help with headache symptoms and send her home with a course of Reglan to be taken as needed.  Otherwise if she develops fevers, significantly worsening headache, vision changes, unilateral weakness or other new or concerning symptoms is encouraged to return to the emergency department for repeat evaluation.  Otherwise recommend routine follow-up with her OB/GYN as if her hypertension persists over the next few weeks will likely need additional antihypertensive medication during her pregnancy.  Patient expresses understanding and is comfortable discharge.  Discharged in stable condition.       6:44 PM I met and evaluated the patient.       Medical Decision Making  Obtained supplemental history:Supplemental history obtained?: No  Reviewed external records: External records reviewed?: Documented in chart  Care impacted by chronic illness:Hypertension  Care significantly affected by social determinants of health:N/A  Did you consider  but not order tests?: Work up considered but not performed and documented in chart, if applicable  Did you interpret images independently?: Independent interpretation of ECG and images noted in documentation, when applicable.  Consultation discussion with other provider:Did you involve another provider (consultant, , pharmacy, etc.)?: No  Discharge. I prescribed additional prescription strength medication(s) as charted. I considered admission, but discharged patient after significant clinical improvement.          At the conclusion of the encounter I discussed the results of all of the tests and the disposition. The questions were answered. The patient or family acknowledged understanding and was agreeable with the care plan.     0 minutes of critical care time     MEDICATIONS GIVEN IN THE EMERGENCY:  Medications   sodium chloride 0.9% BOLUS 1,000 mL (0 mLs Intravenous Stopped 4/1/24 1830)   metoclopramide (REGLAN) injection 10 mg (10 mg Intravenous $Given 4/1/24 1746)   prochlorperazine (COMPAZINE) injection 10 mg (10 mg Intravenous $Given 4/1/24 1856)   diphenhydrAMINE (BENADRYL) injection 25 mg (25 mg Intravenous $Given 4/1/24 1856)   dexAMETHasone (DECADRON) tablet 6 mg (6 mg Oral $Given 4/1/24 2159)       NEW PRESCRIPTIONS STARTED AT TODAY'S ER VISIT  Discharge Medication List as of 4/1/2024 10:00 PM        START taking these medications    Details   metoclopramide (REGLAN) 10 MG tablet Take 1 tablet (10 mg) by mouth 4 times daily as needed (headache), Disp-20 tablet, R-0, Local Print                =================================================================    HPI    Patient information was obtained from: Patient     Use of : N/A         Katina Montilla is a 33 year old female with a pertinent history of HTN on hydralazine and gestational diabetes who presents to this ED by private vehicle for evaluation of a headache, nausea and vomiting.    The patient is 17-18 weeks pregnant. She endorses a  persistent headache since yesterday at 1 PM and notes a history of headaches, but the headaches have never lasted this long. Patient does have nausea and vomiting due to her pregnancy, but the nausea and vomiting have worsened today. She also endorses lower abdominal pain and lightheadedness since this morning but denies vision changes, fever, weakness, dysuria, or hematuria. Patient has taken 6 tylenol today without improvement. No other complaints at this time.      REVIEW OF SYSTEMS   Refer to the [facility]    PAST MEDICAL HISTORY:  History reviewed. No pertinent past medical history.    PAST SURGICAL HISTORY:  History reviewed. No pertinent surgical history.        CURRENT MEDICATIONS:    metoclopramide (REGLAN) 10 MG tablet        ALLERGIES:  No Known Allergies    FAMILY HISTORY:  History reviewed. No pertinent family history.    SOCIAL HISTORY:   Social History     Socioeconomic History    Marital status: Single       VITALS:  BP (!) 147/70   Pulse 92   Temp 97.9  F (36.6  C)   Resp 18   Wt 108 kg (238 lb)   LMP  (LMP Unknown)   SpO2 96%   BMI 46.48 kg/m      PHYSICAL EXAM    Constitutional: Well developed, Well nourished, NAD  HENT: Normocephalic, Atraumatic,  mucous membranes moist, pupils 4 mm and reactive bilaterally, no meningismus  Neck- trachea midline, No stridor.    Eyes:EOMI, Conjunctiva normal, No discharge.   Respiratory: Normal breath sounds, No respiratory distress, No wheezing.    Cardiovascular: Normal heart rate, Regular rhythm, No murmurs   Abdominal: Soft, No tenderness, No rebound or guarding.     Musculoskeletal: no deformity or malalignment   Integument: Warm, Dry, No erythema,    Neurologic: Alert & oriented x 3, no facial droop, normal speech, symmetrical strength in upper lower extremities, normal coordination, no meningismus  Psychiatric: Affect normal, Cooperative.      LAB:  All pertinent labs reviewed and interpreted.  Results for orders placed or performed during the hospital  encounter of 04/01/24   Basic metabolic panel   Result Value Ref Range    Sodium 138 135 - 145 mmol/L    Potassium 4.2 3.4 - 5.3 mmol/L    Chloride 103 98 - 107 mmol/L    Carbon Dioxide (CO2) 22 22 - 29 mmol/L    Anion Gap 13 7 - 15 mmol/L    Urea Nitrogen 9.1 6.0 - 20.0 mg/dL    Creatinine 0.51 0.51 - 0.95 mg/dL    GFR Estimate >90 >60 mL/min/1.73m2    Calcium 9.5 8.6 - 10.0 mg/dL    Glucose 83 70 - 99 mg/dL   Hepatic function panel   Result Value Ref Range    Protein Total 7.5 6.4 - 8.3 g/dL    Albumin 3.8 3.5 - 5.2 g/dL    Bilirubin Total 0.4 <=1.2 mg/dL    Alkaline Phosphatase 57 40 - 150 U/L    AST 27 0 - 45 U/L    ALT 17 0 - 50 U/L    Bilirubin Direct <0.20 0.00 - 0.30 mg/dL   UA with Microscopic reflex to Culture    Specimen: Urine, Midstream   Result Value Ref Range    Color Urine Light Yellow Colorless, Straw, Light Yellow, Yellow    Appearance Urine Clear Clear    Glucose Urine Negative Negative mg/dL    Bilirubin Urine Negative Negative    Ketones Urine 20 (A) Negative mg/dL    Specific Gravity Urine 1.017 1.001 - 1.030    Blood Urine 0.06 mg/dL (A) Negative    pH Urine 6.5 5.0 - 7.0    Protein Albumin Urine 200 (A) Negative mg/dL    Urobilinogen Urine <2.0 <2.0 mg/dL    Nitrite Urine Negative Negative    Leukocyte Esterase Urine 250 Luciano/uL (A) Negative    Mucus Urine Present (A) None Seen /LPF    RBC Urine 1 <=2 /HPF    WBC Urine 3 <=5 /HPF    Squamous Epithelials Urine 10 (H) <=1 /HPF    Hyaline Casts Urine 2 <=2 /LPF   CBC with platelets and differential   Result Value Ref Range    WBC Count 12.7 (H) 4.0 - 11.0 10e3/uL    RBC Count 5.05 3.80 - 5.20 10e6/uL    Hemoglobin 13.2 11.7 - 15.7 g/dL    Hematocrit 41.2 35.0 - 47.0 %    MCV 82 78 - 100 fL    MCH 26.1 (L) 26.5 - 33.0 pg    MCHC 32.0 31.5 - 36.5 g/dL    RDW 14.3 10.0 - 15.0 %    Platelet Count 257 150 - 450 10e3/uL    % Neutrophils 76 %    % Lymphocytes 16 %    % Monocytes 6 %    % Eosinophils 1 %    % Basophils 0 %    % Immature Granulocytes 1 %     NRBCs per 100 WBC 0 <1 /100    Absolute Neutrophils 9.6 (H) 1.6 - 8.3 10e3/uL    Absolute Lymphocytes 2.1 0.8 - 5.3 10e3/uL    Absolute Monocytes 0.8 0.0 - 1.3 10e3/uL    Absolute Eosinophils 0.2 0.0 - 0.7 10e3/uL    Absolute Basophils 0.0 0.0 - 0.2 10e3/uL    Absolute Immature Granulocytes 0.1 <=0.4 10e3/uL    Absolute NRBCs 0.0 10e3/uL       RADIOLOGY:  Reviewed all pertinent imaging. Please see official radiology report.  No orders to display              I, Shreyas Dee, am serving as a scribe to document services personally performed by Baltazar Cardona MD based on my observation and the provider's statements to me. I, Baltazar Cardona MD, attest that Shreyas Dee is acting in a scribe capacity, has observed my performance of the services and has documented them in accordance with my direction.    Baltazar Cardona MD  [facility] EMERGENCY DEPARTMENT  [address] [place] [id number]  [phone]      Baltazar Cardona MD  04/02/24 0029

## 2024-04-02 LAB — BACTERIA UR CULT: NORMAL

## 2024-04-02 NOTE — DISCHARGE INSTRUCTIONS
Recommend ongoing Tylenol for your headache.  You may also try Reglan which you have been prescribed to see if this helps with the headache symptoms.  If you have significant worsening headache, fevers, intractable vomiting, new vision changes or weakness one-sided body please return to the emergency department for repeat evaluation.

## 2025-03-14 ENCOUNTER — HOSPITAL ENCOUNTER (EMERGENCY)
Facility: HOSPITAL | Age: 35
Discharge: HOME | End: 2025-03-14
Attending: EMERGENCY MEDICINE | Admitting: EMERGENCY MEDICINE
Payer: COMMERCIAL

## 2025-03-14 VITALS
DIASTOLIC BLOOD PRESSURE: 84 MMHG | SYSTOLIC BLOOD PRESSURE: 160 MMHG | HEART RATE: 114 BPM | OXYGEN SATURATION: 94 % | BODY MASS INDEX: 46.72 KG/M2 | TEMPERATURE: 98.4 F | HEIGHT: 60 IN | WEIGHT: 238 LBS | RESPIRATION RATE: 28 BRPM

## 2025-03-14 DIAGNOSIS — T78.40XA ALLERGIC REACTION, INITIAL ENCOUNTER: ICD-10-CM

## 2025-03-14 DIAGNOSIS — T78.2XXA ANAPHYLAXIS, INITIAL ENCOUNTER: ICD-10-CM

## 2025-03-14 DIAGNOSIS — R03.0 ELEVATED BLOOD PRESSURE READING: ICD-10-CM

## 2025-03-14 PROCEDURE — 96361 HYDRATE IV INFUSION ADD-ON: CPT

## 2025-03-14 PROCEDURE — 96375 TX/PRO/DX INJ NEW DRUG ADDON: CPT

## 2025-03-14 PROCEDURE — 250N000009 HC RX 250: Performed by: EMERGENCY MEDICINE

## 2025-03-14 PROCEDURE — 96372 THER/PROPH/DIAG INJ SC/IM: CPT | Performed by: EMERGENCY MEDICINE

## 2025-03-14 PROCEDURE — 250N000011 HC RX IP 250 OP 636: Mod: JZ | Performed by: EMERGENCY MEDICINE

## 2025-03-14 PROCEDURE — 250N000013 HC RX MED GY IP 250 OP 250 PS 637: Performed by: EMERGENCY MEDICINE

## 2025-03-14 PROCEDURE — 99285 EMERGENCY DEPT VISIT HI MDM: CPT | Mod: 25

## 2025-03-14 PROCEDURE — 96374 THER/PROPH/DIAG INJ IV PUSH: CPT

## 2025-03-14 PROCEDURE — 258N000003 HC RX IP 258 OP 636: Performed by: EMERGENCY MEDICINE

## 2025-03-14 RX ORDER — HYDROXYZINE HYDROCHLORIDE 25 MG/1
TABLET, FILM COATED ORAL
Qty: 30 TABLET | Refills: 0 | Status: SHIPPED | OUTPATIENT
Start: 2025-03-14

## 2025-03-14 RX ORDER — METHYLPREDNISOLONE SODIUM SUCCINATE 125 MG/2ML
125 INJECTION INTRAMUSCULAR; INTRAVENOUS ONCE
Status: COMPLETED | OUTPATIENT
Start: 2025-03-14 | End: 2025-03-14

## 2025-03-14 RX ORDER — PREDNISONE 20 MG/1
TABLET ORAL
Qty: 10 TABLET | Refills: 0 | Status: SHIPPED | OUTPATIENT
Start: 2025-03-14

## 2025-03-14 RX ORDER — FAMOTIDINE 20 MG/1
20 TABLET, FILM COATED ORAL 2 TIMES DAILY
Qty: 10 TABLET | Refills: 0 | Status: SHIPPED | OUTPATIENT
Start: 2025-03-14 | End: 2025-03-19

## 2025-03-14 RX ORDER — ALBUTEROL SULFATE 0.83 MG/ML
2.5 SOLUTION RESPIRATORY (INHALATION)
Status: DISCONTINUED | OUTPATIENT
Start: 2025-03-14 | End: 2025-03-14 | Stop reason: HOSPADM

## 2025-03-14 RX ORDER — DIPHENHYDRAMINE HYDROCHLORIDE 50 MG/ML
50 INJECTION, SOLUTION INTRAMUSCULAR; INTRAVENOUS ONCE
Status: COMPLETED | OUTPATIENT
Start: 2025-03-14 | End: 2025-03-14

## 2025-03-14 RX ORDER — LIDOCAINE 40 MG/G
CREAM TOPICAL
Status: DISCONTINUED | OUTPATIENT
Start: 2025-03-14 | End: 2025-03-14 | Stop reason: HOSPADM

## 2025-03-14 RX ORDER — LISINOPRIL 20 MG/1
20 TABLET ORAL ONCE
Status: COMPLETED | OUTPATIENT
Start: 2025-03-14 | End: 2025-03-14

## 2025-03-14 RX ORDER — ACETAMINOPHEN 325 MG/1
975 TABLET ORAL ONCE
Status: COMPLETED | OUTPATIENT
Start: 2025-03-14 | End: 2025-03-14

## 2025-03-14 RX ORDER — EPINEPHRINE 0.3 MG/.3ML
0.3 INJECTION SUBCUTANEOUS
Qty: 2 EACH | Refills: 1 | Status: SHIPPED | OUTPATIENT
Start: 2025-03-14

## 2025-03-14 RX ADMIN — ACETAMINOPHEN 975 MG: 325 TABLET, FILM COATED ORAL at 14:46

## 2025-03-14 RX ADMIN — EPINEPHRINE 0.5 MG: 1 INJECTION INTRAMUSCULAR; INTRAVENOUS; SUBCUTANEOUS at 13:40

## 2025-03-14 RX ADMIN — SODIUM CHLORIDE 1000 ML: 9 INJECTION, SOLUTION INTRAVENOUS at 16:52

## 2025-03-14 RX ADMIN — DIPHENHYDRAMINE HYDROCHLORIDE 50 MG: 50 INJECTION, SOLUTION INTRAMUSCULAR; INTRAVENOUS at 13:43

## 2025-03-14 RX ADMIN — FAMOTIDINE 20 MG: 10 INJECTION, SOLUTION INTRAVENOUS at 13:45

## 2025-03-14 RX ADMIN — METHYLPREDNISOLONE SODIUM SUCCINATE 125 MG: 125 INJECTION INTRAMUSCULAR; INTRAVENOUS at 13:48

## 2025-03-14 RX ADMIN — LISINOPRIL 20 MG: 20 TABLET ORAL at 16:54

## 2025-03-14 ASSESSMENT — COLUMBIA-SUICIDE SEVERITY RATING SCALE - C-SSRS
1. IN THE PAST MONTH, HAVE YOU WISHED YOU WERE DEAD OR WISHED YOU COULD GO TO SLEEP AND NOT WAKE UP?: NO
6. HAVE YOU EVER DONE ANYTHING, STARTED TO DO ANYTHING, OR PREPARED TO DO ANYTHING TO END YOUR LIFE?: NO
1. IN THE PAST MONTH, HAVE YOU WISHED YOU WERE DEAD OR WISHED YOU COULD GO TO SLEEP AND NOT WAKE UP?: NO
2. HAVE YOU ACTUALLY HAD ANY THOUGHTS OF KILLING YOURSELF IN THE PAST MONTH?: NO
6. HAVE YOU EVER DONE ANYTHING, STARTED TO DO ANYTHING, OR PREPARED TO DO ANYTHING TO END YOUR LIFE?: NO
2. HAVE YOU ACTUALLY HAD ANY THOUGHTS OF KILLING YOURSELF IN THE PAST MONTH?: NO

## 2025-03-14 ASSESSMENT — ACTIVITIES OF DAILY LIVING (ADL)
ADLS_ACUITY_SCORE: 41

## 2025-03-14 NOTE — ED TRIAGE NOTES
Pt was at work at 1130 when she started feeling like her lips and throat were swollen and face became very red.  Pt does have a rash on arms and torso.  Voice is very raspy. Pt taken to rm 18. Pt had some [place] nuts today--she has not had them before.

## 2025-03-14 NOTE — ED PROVIDER NOTES
EMERGENCY DEPARTMENT ENCOUNTER      NAME: Katina Montilla  AGE: 34 year old female  YOB: 1990  MRN: [id number]  EVALUATION DATE & TIME: 3/14/2025  1:18 PM    PCP: Nela Johnston    ED PROVIDER: Mojgan Chase M.D.        Chief Complaint   Patient presents with    Allergic Reaction         FINAL IMPRESSION:    No diagnosis found.        MEDICAL DECISION MAKING:    Katina Montilla is a 34 year old female with history of *** who presents to the ER with complaints of ***      Pt signed out at change of shift to {[facility] ED Physician Roster:[id number]}      ED COURSE:  1:30 PM  I met with the patient to gather history and perform my exam. ED course and treatment discussed.  Concerns for anaphylaxis in this patient.  Medications including epinephrine have been ordered.  Nursing at bedside.    1:59 PM  Patient is feeling much better.  Clinically she appears much better on my evaluation as well.  Still has a little bit of a raspy voice but definitely improving.  Redness across her body has dramatically improved.  She is no longer itchy.  Family now present at bedside.  Requesting some Tylenol for her headache.    4:26 PM  Continues to feel significantly better.  Heart rate is still little tachycardic and we will give her some IV fluids.  Some of this may be residual from the epinephrine.  Blood pressure quite elevated though she has not yet taken her lisinopril.  Normally takes it around this time.  We will give her her usual 20 mg of lisinopril.  I did not see any active angioedema as far as lip swelling or tongue swelling and therefore I think it is less likely to be due to her lisinopril.  Symptoms of dyspnea to be improved with epinephrine, Solu-Medrol, Pepcid, and Benadryl which would be more consistent with a traditional allergic reaction and this began after eating lunch so I suspect that lisinopril is the cause is less likely.  Patient will be able to be discharged home after a little more observation  and normalization of her vital signs.    ***    ***  Pt signed out at change of shift to {[facility] ED Physician Roster:[id number]}      ***At the conclusion of the encounter I discussed the results of all of the tests and the ***disposition. Their questions were answered. The patient (and any family present) acknowledged understanding and were agreeable with the care plan.      CRITICAL CARE TIME  Total critical care time: *** minutes  Critical care time was exclusive of separately billable procedures and treating other patients.  Critical care was necessary to treat or prevent imminent or life-threatening deterioration of the following conditions: anaphylaxis  Critical care was time spent personally by me on the following activities: development of treatment plan with patient or surrogate, discussions with consultants, examination of patient, evaluation of patient's response to treatment, obtaining history from patient or surrogate, ordering and performing treatments and interventions, ordering and review of laboratory studies, ordering and review of radiographic studies and re-evaluation of patient's condition, this excludes any separately billable procedures.      CONSULTANTS:  Hospitalist -  ***  ***        MEDICATIONS GIVEN IN THE EMERGENCY:  Medications   lidocaine (LMX4) cream (has no administration in time range)   sodium chloride (PF) 0.9% PF flush 3 mL (3 mLs Intracatheter $Given 3/14/25 1349)   sodium chloride (PF) 0.9% PF flush 3 mL (has no administration in time range)   albuterol (PROVENTIL) neb solution 2.5 mg (has no administration in time range)   lisinopril (ZESTRIL) tablet 20 mg (has no administration in time range)   sodium chloride 0.9% BOLUS 1,000 mL (has no administration in time range)   diphenhydrAMINE (BENADRYL) injection 50 mg (50 mg Intravenous $Given 3/14/25 1343)   famotidine (PEPCID) injection 20 mg (20 mg Intravenous $Given 3/14/25 1345)   methylPREDNISolone Na Suc (solu-MEDROL) injection  125 mg (125 mg Intravenous $Given 3/14/25 1348)   EPINEPHrine (ADRENALIN) kit 0.3-0.5 mg (0.5 mg Intramuscular $Given 3/14/25 1340)   acetaminophen (TYLENOL) tablet 975 mg (975 mg Oral $Given 3/14/25 1446)           NEW PRESCRIPTIONS STARTED AT TODAY'S ER VISIT     Medication List      There are no discharge medications for this visit.             CONDITION:  ***        DISPOSITION:  ***         =================================================================  =================================================================  TRIAGE ASSESSMENT:  Pt was at work at 1130 when she started feeling like her lips and throat were swollen and face became very red.  Pt does have a rash on arms and torso.  Voice is very raspy. Pt taken to rm 18. Pt had some [place] nuts today--she has not had them before.      ED Triage Vitals [03/14/25 1320]   Encounter Vitals Group      BP (!) 224/149      Systolic BP Percentile       Diastolic BP Percentile       Pulse (!) 136      Resp 16      Temp 98.4  F (36.9  C)      Temp src Tympanic      SpO2 94 %      Weight 108 kg (238 lb)      Height 1.524 m (5')         ================================================================  ================================================================    HPI    Patient information was obtained from: patient and friend    Use of Intrepreter: N/A      Katina Montilla is a 34 year old female with history of obesity, HTN who presents to the ER with complaints of allergic reaction.    Patient ate some cheese pizza and chicken salad that may have contained pine nuts.  She states she is never had a reaction like this before.  This started around 1130.  Her voice is more raspy and that her face is more swollen and red.  She reports diffuse rash and that her feet are especially itchy.  This all began after eating.  She has not taken any medications prior to arrival.    ***          CHART REVIEW:  ***      REVIEW OF SYSTEMS  Review of Systems    ***      PAST MEDICAL  HISTORY:  History reviewed. No pertinent past medical history.      PAST SURGICAL HISTORY:  History reviewed. No pertinent surgical history.      CURRENT MEDICATIONS:    Prior to Admission medications    Not on File         ALLERGIES:  No Known Allergies      FAMILY HISTORY:  History reviewed. No pertinent family history.      SOCIAL HISTORY:  Social History     Socioeconomic History    Marital status: Single     Social Drivers of Health     Financial Resource Strain: Low Risk  (9/20/2023)    Received from [facility]    Financial Resource Strain     Difficulty of Paying Living Expenses: 3   Food Insecurity: No Food Insecurity (8/20/2024)    Received from [facility]    Food Insecurity     Do you worry your food will run out before you are able to buy more?: 1   Transportation Needs: No Transportation Needs (8/20/2024)    [facility]    Transportation Needs     Does lack of transportation keep you from medical appointments?: 1     Does lack of transportation keep you from work, meetings or getting things that you need?: 1   Social Connections: Socially Integrated (8/20/2024)    Received from [facility]    Social Connections     Do you often feel lonely or isolated from those around you?: 0   Housing Stability: Low Risk  (8/20/2024)    Received from [facility]    Housing Stability     What is your housing situation today?: 1         VITALS:  Patient Vitals for the past 24 hrs:   BP Temp Temp src Pulse Resp SpO2 Height Weight   03/14/25 1412 (!) 218/110 -- -- 117 -- 97 % -- --   03/14/25 1350 (!) 212/126 -- -- 114 21 92 % -- --   03/14/25 1330 (!) 221/129 -- -- (!) 134 20 95 % -- --   03/14/25 1320 (!) 224/149 98.4  F (36.9  C) Tympanic (!) 136 16 94 % 1.524 m (5') 108 kg (238 lb)       Wt  "Readings from Last 3 Encounters:   03/14/25 108 kg (238 lb)   04/01/24 108 kg (238 lb)   01/08/24 105.2 kg (232 lb)       Estimated Creatinine Clearance: 173 mL/min (based on SCr of 0.51 mg/dL).    PHYSICAL EXAM    Constitutional:  Well developed, Well nourished, NAD  HENT:  Normocephalic, Atraumatic, Bilateral external ears normal,  Nose normal. Neck- Supple, No stridor.  Patient is very reddened.  Do not appreciate any significant lip or tongue swelling though her voice does sound a bit raspy.  She is able to speak full sentences.  No actual stridor.  Eyes:  PERRL, EOMI, Conjunctiva normal, No discharge.  Respiratory:  Normal breath sounds, No respiratory distress, No wheezing, Speaks full sentences easily. No cough.   Cardiovascular:  Normal heart rate, Regular rhythm, No murmurs , No rubs, No gallops.   GI:  +obesity.  Bowel sounds normal, Soft, No tenderness, No masses, No flank tenderness. No rebound or guarding.   : deferred  Musculoskeletal:  No cyanosis, No clubbing. Good range of motion in all major joints. No tenderness to palpation or major deformities noted.   Integument:  Warm, Dry, diffuse hives, no petechiae.   Neurologic:  Alert & oriented x 3,  No focal deficits noted.   Psychiatric:  Affect normal, Cooperative      LAB:  All pertinent labs reviewed and interpreted.  No results found for this or any previous visit (from the past 24 hours).    No results found for: \"ABORH\"        RADIOLOGY:  Reviewed all pertinent imaging. Please see official radiology report.    No orders to display         EKG:    Indication: Chest pain ***    Performed at: ***  Impression: ***      I have independently reviewed and interpreted the EKG(s) documented above.        PROCEDURES:  ***      ***  {Sepsis/Stemi/Stroke:738234::\"None\"}      Do we need to add: ***  SEVERESEPSIS ***  SEPTICSHOCK ***  Critical Care ***    Medical Decision Making  Obtained supplemental history:{Supplemental history obtained?:579763}  Reviewed " "external records: {External records reviewed?:745513}  Care impacted by chronic illness:{CHRONIC ILLNESS:011280}  {Did you consider but not order tests?:120144}  {Did you interpret images independently?:136314}  Consultation discussion with other provider:{Did you involve another provider (consultant, , pharmacy, etc.)?:778319}  {ADMIT VS D/C:988541}    MIPS (CTPE, Dental pain, Bryant, Sinusitis, Asthma/COPD, Head Trauma): {[facility] MIPS DOCUMENTATION:437081}    SEPSIS: {Sepsis/Stemi/Stroke:971235::\"None\"}      Mojgan Chase M.D. [facility]  Emergency Medicine and Medical Toxicology  [facility] EMERGENCY DEPARTMENT  [address] [place] [id number] [phone]  Dept: [phone]        "

## 2025-03-14 NOTE — ED NOTES
Pt presents for evaluation of allergic reaction to unknown substance. She thinks it may be pine nuts in hummus. Diffuse hives. Hoarse voice. She reports some SOB and a sensation that something is stuck in her throat.   SKIN: Hives on upper body.   HEENT: Normocephalic, PERRL, alert and oriented x 3.   CHEST: Symmetrical rise, rhonchi noted in upper airway. No reported CP.  ABD: NTND. Positive for nausea.   EXT: YOUSSEF x 4  Rest of exam unremarkable.          1430  SKIN: Hives improving. Pt reports itching is much improved since arrival.   HEENT: Normocephalic, alert and oriented x 3.   CHEST: Symmetrical rise, breath sounds are equal and clear bilaterally. Rhonchi improved. SOB improved.   ABD: NTND. No reported N&V or diarrhea.  EXT: YOUSSEF x 4  Rest of exam unremarkable.

## 2025-03-14 NOTE — DISCHARGE INSTRUCTIONS
Follow-up next week to have your blood pressure rechecked at your primary care physician's office  Continue the antihistamine (hydroxyzine) every 6 hours for the next 24 hours.  After that you only need to take the antihistamine every 6 hours if needed for hives.      Take the prednisone and famotidine daily as directed until gone.     Use the epipen (inject into front of thigh) if you have trouble breathing or tongue/lip swelling thought to be caused by allergic reaction and then go to the nearest Emergency Room (ER) immediately.      Consider seeing an allergist to see if the cause can be determined/confirmed).      Return to the ER if you use the epipen, trouble breathing, tongue/lip/throat swelling, or any other concerns.      The rash/hives may come and go over the next few days until the agent responsible for the reaction is out of your system.  Avoid hot showers/baths or hot weather as this can make the rash worse.     It would be best to be with a responsible adult for the next 24 hours just in case the reaction gets worse or you need any help quickly.    Thank you for choosing [facility] Emergency Department.  It has been my pleasure caring for you today.     ~Dr. Rena MD

## 2025-03-14 NOTE — Clinical Note
Katina Montilla was seen and treated in our emergency department on 3/14/2025.  She may return to work on 03/17/2025.       If you have any questions or concerns, please don't hesitate to call.      Mojgan Chase MD

## 2025-03-15 NOTE — ED PROVIDER NOTES
eMERGENCY dEPARTMENT PROGRESS NOTE        ED COURSE AND MEDICAL DECISION MAKING  Patient was signed out to me by Dr Chase at change of shift (5:40 PM)      Katina Montilla is a 34 year old female who presents with symptoms of anaphylaxis with facial edema and wheezing after ingesting pine nuts.  No previous history of anaphylaxis.  Patient had received epinephrine, diphenhydramine, Solu-Medrol, Pepcid.  Initial facial swelling is improved.  Patient was also short of breath with wheezing which resolved with albuterol nebulizer.   patient has history of hypertension and has been intermittently hypertensive and received lisinopril.  7:43 PM blood pressure somewhat improved but remains hypertensive with /80.  She remains mildly tachycardic with heart rate 110.  Patient overall feels improved and asymptomatic.  She feels that in the past she has had elevation in her heart rate in medical settings and also has a mild headache for which she does not wish any medications for currently which may be contributing.  Patient feels comfortable with discharge and will discharge with recommendation for follow-up with her primary care physician next week for recheck of her blood pressure and patient will continue medications as prescribed by Dr. Cristobal for management of anaphylactic reaction including EpiPen.  Patient is given referral to allergist for follow-up in addition to primary care physician.  Return criteria discussed and if recurrent symptoms of anaphylaxis including generalized itching, lip or tongue swelling, difficulty breathing will administer the epinephrine pen and return to the emergency department.    LAB  Pertinent labs results reviewed          RADIOLOGY    Pertinent imaging reviewed   Please see official radiology report.  No orders to display       FINAL IMPRESSION    Anaphylaxis          Blane Weiner DO  03/14/25 1944

## 2025-05-24 ENCOUNTER — APPOINTMENT (OUTPATIENT)
Dept: RADIOLOGY | Facility: HOSPITAL | Age: 35
End: 2025-05-24
Payer: COMMERCIAL

## 2025-05-24 ENCOUNTER — HOSPITAL ENCOUNTER (INPATIENT)
Facility: HOSPITAL | Age: 35
LOS: 2 days | Discharge: HOME | End: 2025-05-26
Attending: FAMILY MEDICINE
Payer: COMMERCIAL

## 2025-05-24 DIAGNOSIS — N39.0 UTI (URINARY TRACT INFECTION): ICD-10-CM

## 2025-05-24 DIAGNOSIS — K52.9 ACUTE GASTROENTERITIS: ICD-10-CM

## 2025-05-24 DIAGNOSIS — I10 BENIGN ESSENTIAL HYPERTENSION: Primary | ICD-10-CM

## 2025-05-24 DIAGNOSIS — N17.9 AKI (ACUTE KIDNEY INJURY): ICD-10-CM

## 2025-05-24 LAB
ADV 40+41 DNA STL QL NAA+NON-PROBE: NEGATIVE
ALBUMIN SERPL BCG-MCNC: 4.4 G/DL (ref 3.5–5.2)
ALBUMIN UR-MCNC: 30 MG/DL
ALP SERPL-CCNC: 116 U/L (ref 40–150)
ALT SERPL W P-5'-P-CCNC: 31 U/L (ref 0–50)
ANION GAP SERPL CALCULATED.3IONS-SCNC: 15 MMOL/L (ref 7–15)
APPEARANCE UR: ABNORMAL
AST SERPL W P-5'-P-CCNC: 25 U/L (ref 0–45)
ASTRO TYP 1-8 RNA STL QL NAA+NON-PROBE: POSITIVE
BACTERIA #/AREA URNS HPF: ABNORMAL /HPF
BASOPHILS # BLD AUTO: 0 10E3/UL (ref 0–0.2)
BASOPHILS NFR BLD AUTO: 0 %
BILIRUB SERPL-MCNC: 1 MG/DL
BILIRUB UR QL STRIP: NEGATIVE
BUN SERPL-MCNC: 28.8 MG/DL (ref 6–20)
C CAYETANENSIS DNA STL QL NAA+NON-PROBE: NEGATIVE
CALCIUM SERPL-MCNC: 9.5 MG/DL (ref 8.8–10.4)
CAMPYLOBACTER DNA SPEC NAA+PROBE: NEGATIVE
CHLORIDE SERPL-SCNC: 102 MMOL/L (ref 98–107)
COLOR UR AUTO: ABNORMAL
CREAT SERPL-MCNC: 1.99 MG/DL (ref 0.51–0.95)
CRYPTOSP DNA STL QL NAA+NON-PROBE: NEGATIVE
E COLI O157 DNA STL QL NAA+NON-PROBE: ABNORMAL
E HISTOLYT DNA STL QL NAA+NON-PROBE: NEGATIVE
EAEC ASTA GENE ISLT QL NAA+PROBE: NEGATIVE
EC STX1+STX2 GENES STL QL NAA+NON-PROBE: NEGATIVE
EGFRCR SERPLBLD CKD-EPI 2021: 33 ML/MIN/1.73M2
EOSINOPHIL # BLD AUTO: 0.1 10E3/UL (ref 0–0.7)
EOSINOPHIL NFR BLD AUTO: 1 %
EPEC EAE GENE STL QL NAA+NON-PROBE: NEGATIVE
ERYTHROCYTE [DISTWIDTH] IN BLOOD BY AUTOMATED COUNT: 15.2 % (ref 10–15)
EST. AVERAGE GLUCOSE BLD GHB EST-MCNC: 117 MG/DL
ETEC LTA+ST1A+ST1B TOX ST NAA+NON-PROBE: POSITIVE
G LAMBLIA DNA STL QL NAA+NON-PROBE: NEGATIVE
GLUCOSE BLDC GLUCOMTR-MCNC: 82 MG/DL (ref 70–99)
GLUCOSE SERPL-MCNC: 117 MG/DL (ref 70–99)
GLUCOSE UR STRIP-MCNC: NEGATIVE MG/DL
HBA1C MFR BLD: 5.7 %
HCG UR QL: NEGATIVE
HCO3 SERPL-SCNC: 20 MMOL/L (ref 22–29)
HCT VFR BLD AUTO: 47.8 % (ref 35–47)
HGB BLD-MCNC: 15.7 G/DL (ref 11.7–15.7)
HGB UR QL STRIP: ABNORMAL
HOLD SPECIMEN: NORMAL
HOLD SPECIMEN: NORMAL
HYALINE CASTS: 9 /LPF
IMM GRANULOCYTES # BLD: 0 10E3/UL
IMM GRANULOCYTES NFR BLD: 1 %
KETONES UR STRIP-MCNC: NEGATIVE MG/DL
LEUKOCYTE ESTERASE UR QL STRIP: ABNORMAL
LIPASE SERPL-CCNC: 38 U/L (ref 13–60)
LYMPHOCYTES # BLD AUTO: 0.9 10E3/UL (ref 0.8–5.3)
LYMPHOCYTES NFR BLD AUTO: 11 %
MAGNESIUM SERPL-MCNC: 2 MG/DL (ref 1.7–2.3)
MCH RBC QN AUTO: 25.8 PG (ref 26.5–33)
MCHC RBC AUTO-ENTMCNC: 32.8 G/DL (ref 31.5–36.5)
MCV RBC AUTO: 79 FL (ref 78–100)
MONOCYTES # BLD AUTO: 0.6 10E3/UL (ref 0–1.3)
MONOCYTES NFR BLD AUTO: 8 %
MUCOUS THREADS #/AREA URNS LPF: PRESENT /LPF
NEUTROPHILS # BLD AUTO: 6.4 10E3/UL (ref 1.6–8.3)
NEUTROPHILS NFR BLD AUTO: 79 %
NITRATE UR QL: NEGATIVE
NOROVIRUS GI+II RNA STL QL NAA+NON-PROBE: NEGATIVE
NRBC # BLD AUTO: 0 10E3/UL
NRBC BLD AUTO-RTO: 0 /100
P SHIGELLOIDES DNA STL QL NAA+NON-PROBE: NEGATIVE
PH UR STRIP: 5.5 [PH] (ref 5–7)
PHOSPHATE SERPL-MCNC: 3.9 MG/DL (ref 2.5–4.5)
PLATELET # BLD AUTO: 301 10E3/UL (ref 150–450)
POTASSIUM SERPL-SCNC: 3.7 MMOL/L (ref 3.4–5.3)
PROT SERPL-MCNC: 8.3 G/DL (ref 6.4–8.3)
RBC # BLD AUTO: 6.08 10E6/UL (ref 3.8–5.2)
RBC URINE: 2 /HPF
RVA RNA STL QL NAA+NON-PROBE: NEGATIVE
SALMONELLA SP RPOD STL QL NAA+PROBE: NEGATIVE
SAPO I+II+IV+V RNA STL QL NAA+NON-PROBE: NEGATIVE
SHIGELLA SP+EIEC IPAH ST NAA+NON-PROBE: NEGATIVE
SODIUM SERPL-SCNC: 137 MMOL/L (ref 135–145)
SP GR UR STRIP: 1.01 (ref 1–1.03)
SQUAMOUS EPITHELIAL: 2 /HPF
TROPONIN T SERPL HS-MCNC: 14 NG/L
TROPONIN T SERPL HS-MCNC: 9 NG/L
UROBILINOGEN UR STRIP-MCNC: NORMAL MG/DL
V CHOLERAE DNA SPEC QL NAA+PROBE: NEGATIVE
VIBRIO DNA SPEC NAA+PROBE: NEGATIVE
WBC # BLD AUTO: 8 10E3/UL (ref 4–11)
WBC CLUMPS #/AREA URNS HPF: PRESENT /HPF
WBC URINE: 7 /HPF
Y ENTEROCOL DNA STL QL NAA+PROBE: NEGATIVE

## 2025-05-24 PROCEDURE — 85041 AUTOMATED RBC COUNT: CPT

## 2025-05-24 PROCEDURE — 81025 URINE PREGNANCY TEST: CPT | Performed by: EMERGENCY MEDICINE

## 2025-05-24 PROCEDURE — 82962 GLUCOSE BLOOD TEST: CPT

## 2025-05-24 PROCEDURE — 36415 COLL VENOUS BLD VENIPUNCTURE: CPT

## 2025-05-24 PROCEDURE — 250N000013 HC RX MED GY IP 250 OP 250 PS 637: Performed by: STUDENT IN AN ORGANIZED HEALTH CARE EDUCATION/TRAINING PROGRAM

## 2025-05-24 PROCEDURE — 250N000011 HC RX IP 250 OP 636: Performed by: STUDENT IN AN ORGANIZED HEALTH CARE EDUCATION/TRAINING PROGRAM

## 2025-05-24 PROCEDURE — 87507 IADNA-DNA/RNA PROBE TQ 12-25: CPT

## 2025-05-24 PROCEDURE — 83690 ASSAY OF LIPASE: CPT

## 2025-05-24 PROCEDURE — 258N000003 HC RX IP 258 OP 636: Performed by: STUDENT IN AN ORGANIZED HEALTH CARE EDUCATION/TRAINING PROGRAM

## 2025-05-24 PROCEDURE — 81001 URINALYSIS AUTO W/SCOPE: CPT | Performed by: EMERGENCY MEDICINE

## 2025-05-24 PROCEDURE — 99285 EMERGENCY DEPT VISIT HI MDM: CPT

## 2025-05-24 PROCEDURE — 120N000001 HC R&B MED SURG/OB

## 2025-05-24 PROCEDURE — 84484 ASSAY OF TROPONIN QUANT: CPT

## 2025-05-24 PROCEDURE — 96361 HYDRATE IV INFUSION ADD-ON: CPT

## 2025-05-24 PROCEDURE — 84520 ASSAY OF UREA NITROGEN: CPT

## 2025-05-24 PROCEDURE — 83036 HEMOGLOBIN GLYCOSYLATED A1C: CPT | Performed by: STUDENT IN AN ORGANIZED HEALTH CARE EDUCATION/TRAINING PROGRAM

## 2025-05-24 PROCEDURE — 93005 ELECTROCARDIOGRAM TRACING: CPT

## 2025-05-24 PROCEDURE — 99223 1ST HOSP IP/OBS HIGH 75: CPT | Performed by: STUDENT IN AN ORGANIZED HEALTH CARE EDUCATION/TRAINING PROGRAM

## 2025-05-24 PROCEDURE — 71046 X-RAY EXAM CHEST 2 VIEWS: CPT

## 2025-05-24 PROCEDURE — 250N000011 HC RX IP 250 OP 636

## 2025-05-24 PROCEDURE — 83735 ASSAY OF MAGNESIUM: CPT

## 2025-05-24 PROCEDURE — 87086 URINE CULTURE/COLONY COUNT: CPT | Performed by: EMERGENCY MEDICINE

## 2025-05-24 PROCEDURE — 96375 TX/PRO/DX INJ NEW DRUG ADDON: CPT

## 2025-05-24 PROCEDURE — 84100 ASSAY OF PHOSPHORUS: CPT | Performed by: STUDENT IN AN ORGANIZED HEALTH CARE EDUCATION/TRAINING PROGRAM

## 2025-05-24 PROCEDURE — 96374 THER/PROPH/DIAG INJ IV PUSH: CPT

## 2025-05-24 PROCEDURE — 87493 C DIFF AMPLIFIED PROBE: CPT

## 2025-05-24 PROCEDURE — 258N000003 HC RX IP 258 OP 636

## 2025-05-24 RX ORDER — ONDANSETRON 2 MG/ML
4 INJECTION INTRAMUSCULAR; INTRAVENOUS ONCE
Status: COMPLETED | OUTPATIENT
Start: 2025-05-24 | End: 2025-05-24

## 2025-05-24 RX ORDER — ONDANSETRON 2 MG/ML
4 INJECTION INTRAMUSCULAR; INTRAVENOUS EVERY 6 HOURS PRN
Status: DISCONTINUED | OUTPATIENT
Start: 2025-05-24 | End: 2025-05-25

## 2025-05-24 RX ORDER — ACETAMINOPHEN 325 MG/1
650 TABLET ORAL EVERY 4 HOURS PRN
Status: DISCONTINUED | OUTPATIENT
Start: 2025-05-24 | End: 2025-05-26 | Stop reason: HOSPADM

## 2025-05-24 RX ORDER — CALCIUM CARBONATE 500 MG/1
1000 TABLET, CHEWABLE ORAL 4 TIMES DAILY PRN
Status: DISCONTINUED | OUTPATIENT
Start: 2025-05-24 | End: 2025-05-26 | Stop reason: HOSPADM

## 2025-05-24 RX ORDER — LOSARTAN POTASSIUM AND HYDROCHLOROTHIAZIDE 12.5; 5 MG/1; MG/1
1 TABLET ORAL DAILY
Status: DISCONTINUED | OUTPATIENT
Start: 2025-05-25 | End: 2025-05-26 | Stop reason: HOSPADM

## 2025-05-24 RX ORDER — AMOXICILLIN 250 MG
1 CAPSULE ORAL 2 TIMES DAILY PRN
Status: DISCONTINUED | OUTPATIENT
Start: 2025-05-24 | End: 2025-05-26

## 2025-05-24 RX ORDER — BUSPIRONE HYDROCHLORIDE 5 MG/1
5 TABLET ORAL 3 TIMES DAILY PRN
Status: DISCONTINUED | OUTPATIENT
Start: 2025-05-24 | End: 2025-05-26 | Stop reason: HOSPADM

## 2025-05-24 RX ORDER — LABETALOL 200 MG/1
800 TABLET, FILM COATED ORAL 2 TIMES DAILY
Status: DISCONTINUED | OUTPATIENT
Start: 2025-05-24 | End: 2025-05-26 | Stop reason: HOSPADM

## 2025-05-24 RX ORDER — BUSPIRONE HYDROCHLORIDE 5 MG/1
5 TABLET ORAL 3 TIMES DAILY PRN
COMMUNITY
Start: 2025-04-25

## 2025-05-24 RX ORDER — TRIAMCINOLONE ACETONIDE 5 MG/G
CREAM TOPICAL 3 TIMES DAILY PRN
COMMUNITY
Start: 2024-12-06

## 2025-05-24 RX ORDER — AMOXICILLIN 250 MG
2 CAPSULE ORAL 2 TIMES DAILY PRN
Status: DISCONTINUED | OUTPATIENT
Start: 2025-05-24 | End: 2025-05-26

## 2025-05-24 RX ORDER — ACETAMINOPHEN 650 MG/1
650 SUPPOSITORY RECTAL EVERY 4 HOURS PRN
Status: DISCONTINUED | OUTPATIENT
Start: 2025-05-24 | End: 2025-05-26 | Stop reason: HOSPADM

## 2025-05-24 RX ORDER — LOSARTAN POTASSIUM AND HYDROCHLOROTHIAZIDE 12.5; 5 MG/1; MG/1
1 TABLET ORAL DAILY
Status: ON HOLD | COMMUNITY
Start: 2025-04-25 | End: 2025-05-26

## 2025-05-24 RX ORDER — ONDANSETRON 2 MG/ML
4 INJECTION INTRAMUSCULAR; INTRAVENOUS EVERY 6 HOURS PRN
Status: DISCONTINUED | OUTPATIENT
Start: 2025-05-24 | End: 2025-05-24

## 2025-05-24 RX ORDER — ALBUTEROL SULFATE 90 UG/1
1-2 INHALANT RESPIRATORY (INHALATION) EVERY 4 HOURS PRN
Status: DISCONTINUED | OUTPATIENT
Start: 2025-05-24 | End: 2025-05-26 | Stop reason: HOSPADM

## 2025-05-24 RX ORDER — LABETALOL 200 MG/1
800 TABLET, FILM COATED ORAL 2 TIMES DAILY
Status: ON HOLD | COMMUNITY
Start: 2025-04-25 | End: 2025-05-26

## 2025-05-24 RX ORDER — LIDOCAINE 40 MG/G
CREAM TOPICAL
Status: DISCONTINUED | OUTPATIENT
Start: 2025-05-24 | End: 2025-05-26 | Stop reason: HOSPADM

## 2025-05-24 RX ORDER — SODIUM CHLORIDE 9 MG/ML
INJECTION, SOLUTION INTRAVENOUS CONTINUOUS
Status: ACTIVE | OUTPATIENT
Start: 2025-05-24 | End: 2025-05-25

## 2025-05-24 RX ORDER — ALBUTEROL SULFATE 90 UG/1
1-2 INHALANT RESPIRATORY (INHALATION) EVERY 4 HOURS PRN
COMMUNITY
Start: 2025-04-25

## 2025-05-24 RX ORDER — CEFTRIAXONE 1 G/1
1 INJECTION, POWDER, FOR SOLUTION INTRAMUSCULAR; INTRAVENOUS EVERY 24 HOURS
Status: DISCONTINUED | OUTPATIENT
Start: 2025-05-24 | End: 2025-05-26

## 2025-05-24 RX ORDER — METRONIDAZOLE 500 MG/100ML
500 INJECTION, SOLUTION INTRAVENOUS EVERY 12 HOURS
Status: DISCONTINUED | OUTPATIENT
Start: 2025-05-24 | End: 2025-05-26

## 2025-05-24 RX ORDER — ONDANSETRON 4 MG/1
4 TABLET, ORALLY DISINTEGRATING ORAL EVERY 6 HOURS PRN
Status: DISCONTINUED | OUTPATIENT
Start: 2025-05-24 | End: 2025-05-26

## 2025-05-24 RX ADMIN — SODIUM CHLORIDE 1000 ML: 0.9 INJECTION, SOLUTION INTRAVENOUS at 16:05

## 2025-05-24 RX ADMIN — SODIUM CHLORIDE 1000 ML: 0.9 INJECTION, SOLUTION INTRAVENOUS at 17:48

## 2025-05-24 RX ADMIN — LABETALOL HYDROCHLORIDE 800 MG: 200 TABLET ORAL at 21:45

## 2025-05-24 RX ADMIN — CEFTRIAXONE SODIUM 1 G: 1 INJECTION, POWDER, FOR SOLUTION INTRAMUSCULAR; INTRAVENOUS at 21:44

## 2025-05-24 RX ADMIN — FAMOTIDINE 20 MG: 10 INJECTION, SOLUTION INTRAVENOUS at 17:02

## 2025-05-24 RX ADMIN — SODIUM CHLORIDE: 0.9 INJECTION, SOLUTION INTRAVENOUS at 21:44

## 2025-05-24 RX ADMIN — ONDANSETRON 4 MG: 2 INJECTION, SOLUTION INTRAMUSCULAR; INTRAVENOUS at 17:02

## 2025-05-24 RX ADMIN — METRONIDAZOLE 500 MG: 500 INJECTION, SOLUTION INTRAVENOUS at 21:44

## 2025-05-24 RX ADMIN — ONDANSETRON 4 MG: 2 INJECTION, SOLUTION INTRAMUSCULAR; INTRAVENOUS at 22:18

## 2025-05-24 ASSESSMENT — ACTIVITIES OF DAILY LIVING (ADL)
ADLS_ACUITY_SCORE: 41
ADLS_ACUITY_SCORE: 42
ADLS_ACUITY_SCORE: 41
ADLS_ACUITY_SCORE: 41

## 2025-05-24 ASSESSMENT — COLUMBIA-SUICIDE SEVERITY RATING SCALE - C-SSRS
6. HAVE YOU EVER DONE ANYTHING, STARTED TO DO ANYTHING, OR PREPARED TO DO ANYTHING TO END YOUR LIFE?: NO
1. IN THE PAST MONTH, HAVE YOU WISHED YOU WERE DEAD OR WISHED YOU COULD GO TO SLEEP AND NOT WAKE UP?: NO
2. HAVE YOU ACTUALLY HAD ANY THOUGHTS OF KILLING YOURSELF IN THE PAST MONTH?: NO

## 2025-05-24 NOTE — ED TRIAGE NOTES
Pt reports n/v/d for three days. Reports not being able to keep water down     Triage Assessment (Adult)       Row Name 05/24/25 1522          Triage Assessment    Airway WDL WDL        Respiratory WDL    Respiratory WDL WDL        Skin Circulation/Temperature WDL    Skin Circulation/Temperature WDL WDL        Cardiac WDL    Cardiac WDL WDL        Peripheral/Neurovascular WDL    Peripheral Neurovascular WDL WDL        Cognitive/Neuro/Behavioral WDL    Cognitive/Neuro/Behavioral WDL WDL

## 2025-05-24 NOTE — ED PROVIDER NOTES
EMERGENCY DEPARTMENT ENCOUNTER      NAME: Katina Montilla  AGE: 34 year old female  YOB: 1990  MRN: [id number]  EVALUATION DATE & TIME: 2025  3:50 PM    PCP: Enrique Vasquezbury    ED PROVIDER: Anastasia Newton PA-C      Chief Complaint   Patient presents with    Nausea, Vomiting, & Diarrhea         FINAL IMPRESSION:  1. Acute gastroenteritis    2. MARYURI (acute kidney injury)    3. UTI (urinary tract infection)          ED COURSE & MEDICAL DECISION MAKIN:20 PM Met with patient for initial interview. Plan for care discussed.  4:45 PM Briefly discussed case with ED physician.   6:25 PM Staffed patient with Dr. Orellana.  6:32 PM Reevaluated and updated patient. Plan for admission and possible transfer discussed with patient and patient is agreeable. All questions and concerns addressed.   6:46 PM Spoke with patient placement. Will attempt for transfer.  7:55 PM Spoke with patient placement. Advised to admit patient here to Obs.   8:02 PM Spoke with hospitalist, Dr. Gondal, who kindly accepts the admission.     34 year old female with a history of HTN (labetalol, losartan-hydrochlorothiazide), DM II (non-insulin dependent) who presents to this ED for evaluation of nausea, vomiting, and diarrhea x5 days. Upon exam, patient is afebrile, hemodynamically stable, but appears uncomfortable. Focal epigastric abdominal tenderness to palpation without guarding or rebound. No CVA tenderness to percussion. RRR and lungs clear to auscultation bilaterally. No peripheral edema or calf tenderness to palpation. Differential diagnosis includes but not limited to acute viral vs bacterial gastroenteritis, PUD, GERD, pancreatitis, gallbladder pathology, electrolyte abnormality, anemia, dehydration, UTI, pregnancy. Using shared decision making, CT deferred at this time. Low suspicion for C diff as no recent hospitalizations or antibiotics or previous history. She reports stools are loose, not straight water and no  specific odor.    CBC without leukocytosis or anemia. CMP with elevated Cr at 1.99, which is significantly increased from her baseline of 0.51 1 year ago consistent with MARYURI. Lipase WNL. Mg WNL. UA with leukocyte esterase, few bacteria and pyuria concerning for possible UTI, culture pending. Stool cultures pending.    Patient was treated with IV NS 2L, Zofran, and Pepcid upon arrival with moderate improvement in symptoms. However, patient still symptomatic and unable to tolerate PO. Symptoms and workup most consistent with MARYURI in setting of gastroenteritis and UTI. Patient was made aware of the above findings. Due to MARYURI and inability to pass PO challenge, patient will require admission. I spoke with Dr. Gondal, hospitalist, who kindly accepts the admission. Dr. Gondal states he will order antibiotics and requesting C diff sample as well, added on.     MIPS (CTPE, Dental pain, Bryant, Sinusitis, Asthma/COPD, Head Trauma): Not Applicable    SEPSIS: None    MEDICATIONS GIVEN IN THE EMERGENCY:  Medications   sodium chloride 0.9 % infusion (has no administration in time range)   famotidine (PEPCID) injection 20 mg (has no administration in time range)   cefTRIAXone (ROCEPHIN) 1 g vial to attach to  mL bag for ADULTS or NS 50 mL bag for PEDS (has no administration in time range)   metroNIDAZOLE (FLAGYL) infusion 500 mg (has no administration in time range)   labetalol (NORMODYNE) tablet 800 mg (has no administration in time range)   losartan-hydrochlorothiazide (HYZAAR) 50-12.5 MG per tablet 1 tablet ( Oral Automatically Held 5/28/25 0800)   albuterol (PROVENTIL HFA/VENTOLIN HFA) inhaler (has no administration in time range)   busPIRone (BUSPAR) tablet 5 mg (has no administration in time range)   lidocaine 1 % 0.1-1 mL (has no administration in time range)   lidocaine (LMX4) cream (has no administration in time range)   sodium chloride (PF) 0.9% PF flush 3 mL (has no administration in time range)   sodium chloride  (PF) 0.9% PF flush 3 mL (has no administration in time range)   senna-docusate (SENOKOT-S/PERICOLACE) 8.6-50 MG per tablet 1 tablet (has no administration in time range)     Or   senna-docusate (SENOKOT-S/PERICOLACE) 8.6-50 MG per tablet 2 tablet (has no administration in time range)   calcium carbonate (TUMS) chewable tablet 1,000 mg (has no administration in time range)   acetaminophen (TYLENOL) tablet 650 mg (has no administration in time range)     Or   acetaminophen (TYLENOL) Suppository 650 mg (has no administration in time range)   ondansetron (ZOFRAN ODT) ODT tab 4 mg (has no administration in time range)     Or   ondansetron (ZOFRAN) injection 4 mg (has no administration in time range)   sodium chloride 0.9% BOLUS 1,000 mL (0 mLs Intravenous Stopped 5/24/25 1657)   ondansetron (ZOFRAN) injection 4 mg (4 mg Intravenous $Given 5/24/25 1702)   famotidine (PEPCID) injection 20 mg (20 mg Intravenous $Given 5/24/25 1702)   sodium chloride 0.9% BOLUS 1,000 mL (0 mLs Intravenous Stopped 5/24/25 1847)       NEW PRESCRIPTIONS STARTED AT TODAY'S ER VISIT  New Prescriptions    No medications on file          =================================================================    HPI    Patient information was obtained from: patient    Use of : N/A       Katina Montilla is a 34 year old female with a pertinent history of HTN (labetalol, losartan-hydrochlorothiazide), DM II (non-insulin dependent) who presents to this ED for evaluation of nausea, vomiting, and diarrhea x5 days.  Patient reports nausea, vomiting and diarrhea that started on Tuesday after she had had some takeout.  She reports that she had pork, which typically does give her issues if this is undercooked.  She reports about 3-5 episodes of vomiting per day and greater than 10 episodes of diarrhea.  She reports that stools are looser, but still somewhat formed.  She denies any mucous present, but notes she may have seen blood once or twice. She denies any  watery appearance, yellow-green appearance, or obvious odor to stools.  She reports some occasional dysuria, otherwise denies any hematuria, urinary urgency or frequency.  She states she last urinated around 11 AM and does feel dehydrated.  She states she has not been able to keep anything down over the last several days.  She is feeling nauseated currently.  She did take an antidiarrheal medication last night.  She has otherwise not been taking anything for her symptoms.  She denies any concerns for pregnancy and has an IUD in place.  She states that her last menstrual period was about 2 and half weeks ago.  She denies any pelvic pain, abnormal vaginal bleeding or discharge.  She does note some intermittent chest tightness.  She states that there is no pattern to this.  She denies any specific exertional or positional symptoms.  She denies any associated shortness of breath.  She denies associated palpitations.  She denies any lightheadedness or recent syncopal episodes.  She denies any lower extremity swelling or unintentional weight gain.  She denies any recent prolonged immobility, travel, hospitalizations, unilateral swelling, calf pain, oral estrogen use, previous DVT/PE.  She denies any URI-like symptoms, cough.  She denies any fevers or chills, or any other complaints.    REVIEW OF SYSTEMS   Review of Systems See HPI    PAST MEDICAL HISTORY:  No past medical history on file.    PAST SURGICAL HISTORY:  No past surgical history on file.        CURRENT MEDICATIONS:    albuterol (PROAIR HFA/PROVENTIL HFA/VENTOLIN HFA) 108 (90 Base) MCG/ACT inhaler  busPIRone (BUSPAR) 5 MG tablet  EPINEPHrine (ANY BX GENERIC EQUIV) 0.3 MG/0.3ML injection 2-pack  labetalol (NORMODYNE) 200 MG tablet  losartan-hydrochlorothiazide (HYZAAR) 50-12.5 MG tablet  triamcinolone (ARISTOCORT HP) 0.5 % external cream        ALLERGIES:  Allergies   Allergen Reactions    Nifedipine Headache    Hydralazine Rash     IV only       FAMILY  HISTORY:  No family history on file.    SOCIAL HISTORY:   Social History     Socioeconomic History    Marital status: Single     Social Drivers of Health     Financial Resource Strain: Low Risk  (9/20/2023)    Received from [facility]    Financial Resource Strain     Difficulty of Paying Living Expenses: 3   Food Insecurity: No Food Insecurity (8/20/2024)    Received from [facility]    Food Insecurity     Do you worry your food will run out before you are able to buy more?: 1   Transportation Needs: No Transportation Needs (8/20/2024)    Received from [facility]    Transportation Needs     Does lack of transportation keep you from medical appointments?: 1     Does lack of transportation keep you from work, meetings or getting things that you need?: 1   Social Connections: Socially Integrated (8/20/2024)    Received from [facility]    Social Connections     Do you often feel lonely or isolated from those around you?: 0   Housing Stability: Low Risk  (8/20/2024)    Received from [facility]    Housing Stability     What is your housing situation today?: 1       VITALS:  /67   Pulse 83   Temp 98  F (36.7  C) (Oral)   Resp 20   Ht 1.524 m (5')   Wt 101.3 kg (223 lb 4.8 oz)   LMP 05/12/2025 (Approximate)   SpO2 98%   BMI 43.61 kg/m      PHYSICAL EXAM    Constitutional:  Alert, in no acute distress. Cooperative.  EYES: Conjunctivae clear.   HENT:  Atraumatic, normocephalic.  Respiratory:  Respirations even, unlabored, in no acute respiratory distress. Lungs clear to auscultation bilaterally without wheeze, rhonchi, or rales. No cough. Speaks in full sentences easily.  Cardiovascular:  Regular rate and rhythm, good peripheral perfusion.   GI: Soft. Obese. Bowel sounds normal. Focal epigastric tenderness to palpation. No guarding, rebound, or  other peritoneal signs. No CVA tenderness to percussion.  Musculoskeletal:  No edema. No cyanosis. Range of motion major extremities intact.    Integument: Warm, Dry. No rash to visualized skin.   Neurologic:  Alert & oriented. No focal deficits noted.   Psych: Normal mood and affect.      LAB:  All pertinent labs reviewed and interpreted.  Results for orders placed or performed during the hospital encounter of 05/24/25   Chest XR,  PA & LAT    Impression    IMPRESSION: Negative chest.   UA with Microscopic reflex to Culture    Specimen: Urine, Clean Catch   Result Value Ref Range    Color Urine Light Yellow Colorless, Straw, Light Yellow, Yellow    Appearance Urine Turbid (A) Clear    Glucose Urine Negative Negative mg/dL    Bilirubin Urine Negative Negative    Ketones Urine Negative Negative mg/dL    Specific Gravity Urine 1.014 1.001 - 1.030    Blood Urine 0.03 mg/dL (A) Negative    pH Urine 5.5 5.0 - 7.0    Protein Albumin Urine 30 (A) Negative mg/dL    Urobilinogen Urine Normal Normal mg/dL    Nitrite Urine Negative Negative    Leukocyte Esterase Urine 250 Luciano/uL (A) Negative    Bacteria Urine Few (A) None Seen /HPF    WBC Clumps Urine Present (A) None Seen /HPF    Mucus Urine Present (A) None Seen /LPF    RBC Urine 2 <=2 /HPF    WBC Urine 7 (H) <=5 /HPF    Squamous Epithelials Urine 2 (H) <=1 /HPF    Hyaline Casts Urine 9 (H) <=2 /LPF   HCG qualitative urine   Result Value Ref Range    hCG Urine Qualitative Negative Negative   Comprehensive metabolic panel   Result Value Ref Range    Sodium 137 135 - 145 mmol/L    Potassium 3.7 3.4 - 5.3 mmol/L    Carbon Dioxide (CO2) 20 (L) 22 - 29 mmol/L    Anion Gap 15 7 - 15 mmol/L    Urea Nitrogen 28.8 (H) 6.0 - 20.0 mg/dL    Creatinine 1.99 (H) 0.51 - 0.95 mg/dL    GFR Estimate 33 (L) >60 mL/min/1.73m2    Calcium 9.5 8.8 - 10.4 mg/dL    Chloride 102 98 - 107 mmol/L    Glucose 117 (H) 70 - 99 mg/dL    Alkaline Phosphatase 116 40 - 150 U/L    AST 25 0 - 45 U/L    ALT 31 0  - 50 U/L    Protein Total 8.3 6.4 - 8.3 g/dL    Albumin 4.4 3.5 - 5.2 g/dL    Bilirubin Total 1.0 <=1.2 mg/dL   Result Value Ref Range    Lipase 38 13 - 60 U/L   Result Value Ref Range    Magnesium 2.0 1.7 - 2.3 mg/dL   CBC with platelets and differential   Result Value Ref Range    WBC Count 8.0 4.0 - 11.0 10e3/uL    RBC Count 6.08 (H) 3.80 - 5.20 10e6/uL    Hemoglobin 15.7 11.7 - 15.7 g/dL    Hematocrit 47.8 (H) 35.0 - 47.0 %    MCV 79 78 - 100 fL    MCH 25.8 (L) 26.5 - 33.0 pg    MCHC 32.8 31.5 - 36.5 g/dL    RDW 15.2 (H) 10.0 - 15.0 %    Platelet Count 301 150 - 450 10e3/uL    % Neutrophils 79 %    % Lymphocytes 11 %    % Monocytes 8 %    % Eosinophils 1 %    % Basophils 0 %    % Immature Granulocytes 1 %    NRBCs per 100 WBC 0 <1 /100    Absolute Neutrophils 6.4 1.6 - 8.3 10e3/uL    Absolute Lymphocytes 0.9 0.8 - 5.3 10e3/uL    Absolute Monocytes 0.6 0.0 - 1.3 10e3/uL    Absolute Eosinophils 0.1 0.0 - 0.7 10e3/uL    Absolute Basophils 0.0 0.0 - 0.2 10e3/uL    Absolute Immature Granulocytes 0.0 <=0.4 10e3/uL    Absolute NRBCs 0.0 10e3/uL   Extra Blue Top Tube   Result Value Ref Range    Hold Specimen JIC    Extra Red Top Tube   Result Value Ref Range    Hold Specimen JIC    Result Value Ref Range    Troponin T, High Sensitivity 14 <=14 ng/L   Result Value Ref Range    Troponin T, High Sensitivity 9 <=14 ng/L       RADIOLOGY:  Reviewed all pertinent imaging. Please see official radiology report.  Chest XR,  PA & LAT   Final Result   IMPRESSION: Negative chest.          EKG:    Performed at: 5/24/25  Impression: NSR, nonspecific ST wave abnormality in lateral leads, prolonged QT  Rate: 89  Rhythm: sinus  Axis: no deviation  AR Interval: 142  QRS Interval: 100  QTc Interval: 523  ST Changes: nonspecific ST wave abnormality in lateral leads  Comparison: 1/8/24, nonspecific ST wave abnormality now present in lateral leads    Dr. Orellana and I have independently reviewed and interpreted the EKG(s) documented  above.    Anastasia Newton PA-C  [facility] EMERGENCY DEPARTMENT  [facility] [facility] [id number] [phone]      Anastasia Newton PA-C  05/24/25 2020

## 2025-05-24 NOTE — ED PROVIDER NOTES
Emergency Department Midlevel Supervisory Note     I had a face to face encounter with this patient seen by the Advanced Practice Provider (LUZ ELENA). I personally made/approved the management plan and take responsibility for the patient management. I personally saw patient and performed a substantive portion of the visit including all aspects of the medical decision making.     ED Course:  6:25 PM  Anastasia Newton PA-C staffed patient with me. I agree with their assessment and plan of management, and I will see the patient. I met with the patient to introduce myself, gather additional history, perform my initial exam, and discuss the plan.     EKG: I reviewed and independently interpreted the patient's EKG, with comments made as listed below. Please see scanned EKG for full report.    Independently reviewed and interpreted by me  Performed at: 5:04 PM  Impression: Nonspecific T wave changes, no acute ischemic changes  Rate: 89  Rhythm: Sinus  Axis: Normal  MI Interval: 142  QRS Interval: 100  QTc Interval: 523  ST Changes: Nonspecific ST changes  Comparison: January 2024, nonspecific ST changes are present    Procedures:  I was present for the key portions of procedures documented in LUZ ELENA/midlevel note, see midlevel note for further details.    MDM:  Patient with history of diabetes, presents today with a couple of days of nausea vomiting diarrhea.  On exam, hypotensive, no tachycardia.  No abdominal tenderness.  Labs demonstrate creatinine 1.99, baseline is 0.5-0.7, most recent on April 27 at outside facility.  Fluids initiated and plan to admit for MARYURI.  Discussed with hospitalist who accepts patient for admission.      1. Acute gastroenteritis    2. MARYURI (acute kidney injury)            Brief HPI:     Katina Montilla is a 34 year old female who presents for evaluation of nausea, vomiting, diarrhea going on for couple of days    Brief Physical Exam: BP 95/50   Pulse 86   Temp 98  F (36.7  C) (Oral)   Resp 20   Ht 1.524 m  yes (5')   Wt 101.3 kg (223 lb 4.8 oz)   LMP 05/12/2025 (Approximate)   SpO2 98%   BMI 43.61 kg/m    Physical Exam  Vitals and nursing note reviewed.   Constitutional:       Appearance: Normal appearance.   HENT:      Head: Normocephalic and atraumatic.      Right Ear: External ear normal.      Left Ear: External ear normal.      Nose: Nose normal.   Eyes:      Extraocular Movements: Extraocular movements intact.      Conjunctiva/sclera: Conjunctivae normal.   Pulmonary:      Effort: Pulmonary effort is normal.   Musculoskeletal:         General: Normal range of motion.      Cervical back: Normal range of motion.      Right lower leg: No edema.      Left lower leg: No edema.   Skin:     General: Skin is warm and dry.   Neurological:      General: No focal deficit present.      Mental Status: She is alert and oriented to person, place, and time. Mental status is at baseline.   Psychiatric:         Mood and Affect: Mood normal.         Behavior: Behavior normal.         Thought Content: Thought content normal.     Labs and Imaging:  Results for orders placed or performed during the hospital encounter of 05/24/25   Chest XR,  PA & LAT    Impression    IMPRESSION: Negative chest.   Comprehensive metabolic panel   Result Value Ref Range    Sodium 137 135 - 145 mmol/L    Potassium 3.7 3.4 - 5.3 mmol/L    Carbon Dioxide (CO2) 20 (L) 22 - 29 mmol/L    Anion Gap 15 7 - 15 mmol/L    Urea Nitrogen 28.8 (H) 6.0 - 20.0 mg/dL    Creatinine 1.99 (H) 0.51 - 0.95 mg/dL    GFR Estimate 33 (L) >60 mL/min/1.73m2    Calcium 9.5 8.8 - 10.4 mg/dL    Chloride 102 98 - 107 mmol/L    Glucose 117 (H) 70 - 99 mg/dL    Alkaline Phosphatase 116 40 - 150 U/L    AST 25 0 - 45 U/L    ALT 31 0 - 50 U/L    Protein Total 8.3 6.4 - 8.3 g/dL    Albumin 4.4 3.5 - 5.2 g/dL    Bilirubin Total 1.0 <=1.2 mg/dL   Result Value Ref Range    Lipase 38 13 - 60 U/L   Result Value Ref Range    Magnesium 2.0 1.7 - 2.3 mg/dL   CBC with platelets and differential    Result Value Ref Range    WBC Count 8.0 4.0 - 11.0 10e3/uL    RBC Count 6.08 (H) 3.80 - 5.20 10e6/uL    Hemoglobin 15.7 11.7 - 15.7 g/dL    Hematocrit 47.8 (H) 35.0 - 47.0 %    MCV 79 78 - 100 fL    MCH 25.8 (L) 26.5 - 33.0 pg    MCHC 32.8 31.5 - 36.5 g/dL    RDW 15.2 (H) 10.0 - 15.0 %    Platelet Count 301 150 - 450 10e3/uL    % Neutrophils 79 %    % Lymphocytes 11 %    % Monocytes 8 %    % Eosinophils 1 %    % Basophils 0 %    % Immature Granulocytes 1 %    NRBCs per 100 WBC 0 <1 /100    Absolute Neutrophils 6.4 1.6 - 8.3 10e3/uL    Absolute Lymphocytes 0.9 0.8 - 5.3 10e3/uL    Absolute Monocytes 0.6 0.0 - 1.3 10e3/uL    Absolute Eosinophils 0.1 0.0 - 0.7 10e3/uL    Absolute Basophils 0.0 0.0 - 0.2 10e3/uL    Absolute Immature Granulocytes 0.0 <=0.4 10e3/uL    Absolute NRBCs 0.0 10e3/uL   Extra Blue Top Tube   Result Value Ref Range    Hold Specimen JIC    Extra Red Top Tube   Result Value Ref Range    Hold Specimen JIC    Result Value Ref Range    Troponin T, High Sensitivity 14 <=14 ng/L         Yifan Orellana M.D.  [facility] EMERGENCY DEPARTMENT  [address] [place] [id number] [phone]     Yifan Orellana MD  05/24/25 2032     yes yes

## 2025-05-25 LAB
ALBUMIN SERPL BCG-MCNC: 3.7 G/DL (ref 3.5–5.2)
ALP SERPL-CCNC: 86 U/L (ref 40–150)
ALT SERPL W P-5'-P-CCNC: 25 U/L (ref 0–50)
ANION GAP SERPL CALCULATED.3IONS-SCNC: 9 MMOL/L (ref 7–15)
ANION GAP SERPL CALCULATED.3IONS-SCNC: 9 MMOL/L (ref 7–15)
AST SERPL W P-5'-P-CCNC: 20 U/L (ref 0–45)
ATRIAL RATE - MUSE: 89 BPM
BILIRUB SERPL-MCNC: 0.7 MG/DL
BUN SERPL-MCNC: 19.9 MG/DL (ref 6–20)
BUN SERPL-MCNC: 19.9 MG/DL (ref 6–20)
C DIFF TOX B STL QL: NEGATIVE
CALCIUM SERPL-MCNC: 8.2 MG/DL (ref 8.8–10.4)
CALCIUM SERPL-MCNC: 8.2 MG/DL (ref 8.8–10.4)
CHLORIDE SERPL-SCNC: 108 MMOL/L (ref 98–107)
CHLORIDE SERPL-SCNC: 108 MMOL/L (ref 98–107)
CREAT SERPL-MCNC: 1.05 MG/DL (ref 0.51–0.95)
CREAT SERPL-MCNC: 1.05 MG/DL (ref 0.51–0.95)
DIASTOLIC BLOOD PRESSURE - MUSE: 50 MMHG
EGFRCR SERPLBLD CKD-EPI 2021: 71 ML/MIN/1.73M2
EGFRCR SERPLBLD CKD-EPI 2021: 71 ML/MIN/1.73M2
ERYTHROCYTE [DISTWIDTH] IN BLOOD BY AUTOMATED COUNT: 15.3 % (ref 10–15)
GLUCOSE BLDC GLUCOMTR-MCNC: 82 MG/DL (ref 70–99)
GLUCOSE BLDC GLUCOMTR-MCNC: 89 MG/DL (ref 70–99)
GLUCOSE BLDC GLUCOMTR-MCNC: 91 MG/DL (ref 70–99)
GLUCOSE BLDC GLUCOMTR-MCNC: 99 MG/DL (ref 70–99)
GLUCOSE SERPL-MCNC: 81 MG/DL (ref 70–99)
GLUCOSE SERPL-MCNC: 81 MG/DL (ref 70–99)
HCO3 SERPL-SCNC: 22 MMOL/L (ref 22–29)
HCO3 SERPL-SCNC: 22 MMOL/L (ref 22–29)
HCT VFR BLD AUTO: 40.1 % (ref 35–47)
HGB BLD-MCNC: 12.9 G/DL (ref 11.7–15.7)
INTERPRETATION ECG - MUSE: NORMAL
MAGNESIUM SERPL-MCNC: 2 MG/DL (ref 1.7–2.3)
MCH RBC QN AUTO: 25.9 PG (ref 26.5–33)
MCHC RBC AUTO-ENTMCNC: 32.2 G/DL (ref 31.5–36.5)
MCV RBC AUTO: 80 FL (ref 78–100)
P AXIS - MUSE: 71 DEGREES
PHOSPHATE SERPL-MCNC: 3 MG/DL (ref 2.5–4.5)
PLATELET # BLD AUTO: 209 10E3/UL (ref 150–450)
POTASSIUM SERPL-SCNC: 3.3 MMOL/L (ref 3.4–5.3)
POTASSIUM SERPL-SCNC: 3.3 MMOL/L (ref 3.4–5.3)
POTASSIUM SERPL-SCNC: 3.9 MMOL/L (ref 3.4–5.3)
PR INTERVAL - MUSE: 142 MS
PROT SERPL-MCNC: 6.5 G/DL (ref 6.4–8.3)
QRS DURATION - MUSE: 100 MS
QT - MUSE: 430 MS
QTC - MUSE: 523 MS
R AXIS - MUSE: 80 DEGREES
RBC # BLD AUTO: 4.99 10E6/UL (ref 3.8–5.2)
SODIUM SERPL-SCNC: 139 MMOL/L (ref 135–145)
SODIUM SERPL-SCNC: 139 MMOL/L (ref 135–145)
SYSTOLIC BLOOD PRESSURE - MUSE: 95 MMHG
T AXIS - MUSE: 0 DEGREES
VENTRICULAR RATE- MUSE: 89 BPM
WBC # BLD AUTO: 5.7 10E3/UL (ref 4–11)

## 2025-05-25 PROCEDURE — 82962 GLUCOSE BLOOD TEST: CPT

## 2025-05-25 PROCEDURE — 250N000013 HC RX MED GY IP 250 OP 250 PS 637: Performed by: INTERNAL MEDICINE

## 2025-05-25 PROCEDURE — 250N000011 HC RX IP 250 OP 636: Performed by: STUDENT IN AN ORGANIZED HEALTH CARE EDUCATION/TRAINING PROGRAM

## 2025-05-25 PROCEDURE — 83735 ASSAY OF MAGNESIUM: CPT | Performed by: INTERNAL MEDICINE

## 2025-05-25 PROCEDURE — 84100 ASSAY OF PHOSPHORUS: CPT | Performed by: INTERNAL MEDICINE

## 2025-05-25 PROCEDURE — 258N000003 HC RX IP 258 OP 636

## 2025-05-25 PROCEDURE — 36415 COLL VENOUS BLD VENIPUNCTURE: CPT | Performed by: INTERNAL MEDICINE

## 2025-05-25 PROCEDURE — 250N000011 HC RX IP 250 OP 636

## 2025-05-25 PROCEDURE — 258N000003 HC RX IP 258 OP 636: Performed by: STUDENT IN AN ORGANIZED HEALTH CARE EDUCATION/TRAINING PROGRAM

## 2025-05-25 PROCEDURE — 85014 HEMATOCRIT: CPT | Performed by: STUDENT IN AN ORGANIZED HEALTH CARE EDUCATION/TRAINING PROGRAM

## 2025-05-25 PROCEDURE — 36415 COLL VENOUS BLD VENIPUNCTURE: CPT | Performed by: STUDENT IN AN ORGANIZED HEALTH CARE EDUCATION/TRAINING PROGRAM

## 2025-05-25 PROCEDURE — 99232 SBSQ HOSP IP/OBS MODERATE 35: CPT | Performed by: INTERNAL MEDICINE

## 2025-05-25 PROCEDURE — 80051 ELECTROLYTE PANEL: CPT | Performed by: STUDENT IN AN ORGANIZED HEALTH CARE EDUCATION/TRAINING PROGRAM

## 2025-05-25 PROCEDURE — 84132 ASSAY OF SERUM POTASSIUM: CPT | Performed by: INTERNAL MEDICINE

## 2025-05-25 PROCEDURE — 120N000001 HC R&B MED SURG/OB

## 2025-05-25 PROCEDURE — 82565 ASSAY OF CREATININE: CPT | Performed by: INTERNAL MEDICINE

## 2025-05-25 RX ORDER — POTASSIUM CHLORIDE 1500 MG/1
40 TABLET, EXTENDED RELEASE ORAL ONCE
Status: COMPLETED | OUTPATIENT
Start: 2025-05-25 | End: 2025-05-25

## 2025-05-25 RX ORDER — DIPHENHYDRAMINE HCL 25 MG
25 CAPSULE ORAL EVERY 6 HOURS PRN
Status: DISCONTINUED | OUTPATIENT
Start: 2025-05-25 | End: 2025-05-25

## 2025-05-25 RX ORDER — DIPHENHYDRAMINE HYDROCHLORIDE 50 MG/ML
25 INJECTION, SOLUTION INTRAMUSCULAR; INTRAVENOUS ONCE
Status: COMPLETED | OUTPATIENT
Start: 2025-05-25 | End: 2025-05-25

## 2025-05-25 RX ORDER — DIPHENHYDRAMINE HCL 25 MG
25 CAPSULE ORAL ONCE
Status: COMPLETED | OUTPATIENT
Start: 2025-05-25 | End: 2025-05-25

## 2025-05-25 RX ORDER — DIPHENHYDRAMINE HYDROCHLORIDE 50 MG/ML
25 INJECTION, SOLUTION INTRAMUSCULAR; INTRAVENOUS EVERY 6 HOURS PRN
Status: DISCONTINUED | OUTPATIENT
Start: 2025-05-25 | End: 2025-05-25

## 2025-05-25 RX ORDER — ONDANSETRON 2 MG/ML
4 INJECTION INTRAMUSCULAR; INTRAVENOUS EVERY 6 HOURS PRN
Status: DISCONTINUED | OUTPATIENT
Start: 2025-05-25 | End: 2025-05-26

## 2025-05-25 RX ADMIN — LABETALOL HYDROCHLORIDE 800 MG: 200 TABLET ORAL at 20:15

## 2025-05-25 RX ADMIN — SODIUM CHLORIDE 500 ML: 0.9 INJECTION, SOLUTION INTRAVENOUS at 00:31

## 2025-05-25 RX ADMIN — POTASSIUM CHLORIDE 40 MEQ: 1500 TABLET, EXTENDED RELEASE ORAL at 10:24

## 2025-05-25 RX ADMIN — LABETALOL HYDROCHLORIDE 800 MG: 200 TABLET ORAL at 08:29

## 2025-05-25 RX ADMIN — FAMOTIDINE 20 MG: 10 INJECTION, SOLUTION INTRAVENOUS at 20:16

## 2025-05-25 RX ADMIN — ONDANSETRON 4 MG: 2 INJECTION, SOLUTION INTRAMUSCULAR; INTRAVENOUS at 12:02

## 2025-05-25 RX ADMIN — DIPHENHYDRAMINE HYDROCHLORIDE 25 MG: 50 INJECTION INTRAMUSCULAR; INTRAVENOUS at 00:43

## 2025-05-25 RX ADMIN — SODIUM CHLORIDE: 0.9 INJECTION, SOLUTION INTRAVENOUS at 05:49

## 2025-05-25 ASSESSMENT — ACTIVITIES OF DAILY LIVING (ADL)
ADLS_ACUITY_SCORE: 27
ADLS_ACUITY_SCORE: 48
ADLS_ACUITY_SCORE: 48
ADLS_ACUITY_SCORE: 27
ADLS_ACUITY_SCORE: 48
ADLS_ACUITY_SCORE: 50
ADLS_ACUITY_SCORE: 48
ADLS_ACUITY_SCORE: 27
ADLS_ACUITY_SCORE: 42
ADLS_ACUITY_SCORE: 48
ADLS_ACUITY_SCORE: 48
ADLS_ACUITY_SCORE: 50

## 2025-05-25 NOTE — SIGNIFICANT EVENT
Significant Event Note    Time of event: 12:52 AM May 25, 2025    Description of event:  Paged by nursing staff as patient is complaining of mild lip swelling/tingling and mild SOB. Evaluated patient who states that she recently noted some mild tingling in her lips and felt they appeared slightly more puffy than normal. Also has a sensation of something caught in her throat which makes her feel that there is tightness in her chest. With this is feeling mildly sort of breath.     Per nursing, the patient is also mildly hypotensive with BP 85/50. All other vitals WNL. Repeat BP on evaluation showing improvement in systolic to 90's.    Patient currently admitted for hypovolemia and dehydration in the setting of food poisoning with severe N/V. Also noted to have UTI and MARYURI. Currently on Flagyl and ceftriaxone for management of UTI/food poisoning. She is also receiving 100 ml/hr IVF and had 2L fluid in the ED today.     GEN: Pleasant female. Appears fatigued. Resting in bed.   HEENT: No stridor present. No drooling. Lips and face do not appear edematous.   PULM: Non-labored breathing. No use of accessory muscles. Clear to ausculation bilaterally. No wheezes or crackles.   CV: Regular rate and rhythm. Normal S1, S2. No rubs, murmurs, or gallops.    EXTREMITIES:  No clubbing, cyanosis, or edema.    PSYCH: Calm. Appropriate affect, insight, judgment.     Plan:  Symptoms may be due to mild allergic reaction, possibly to antibiotics. Patient is maintaining airway, no stridor, and lung sounds are very clear. Maintaining oxygen saturation. Speaking in full sentences.   - Give 25 mg IV benadryl   - Give additional 500 ml bolus NS   - Notify provider if symptoms fail to improve or worsen     Discussed with: bedside nurse    Christin Chu,

## 2025-05-25 NOTE — PLAN OF CARE
Goal Outcome Evaluation:    Problem: Nausea and Vomiting  Goal: Nausea and Vomiting Relief  Outcome: Progressing  Intervention: Prevent and Manage Nausea and Vomiting  Recent Flowsheet Documentation  Taken 5/25/2025 0045 by Casey Hensley, RN  Nausea/Vomiting Interventions:   antiemetic   sips of clear liquids given   stimuli minimized  Taken 5/24/2025 2145 by Casey Hensley, RN  Nausea/Vomiting Interventions:   antiemetic   sips of clear liquids given   stimuli minimized        Pt AO, VSS on RA, denies pain. Intermittent nausea controlled with PRN IV zofran. Pt reported lightheadedness, scratchy throat and oral swelling, resident alerted and assessed. 500ml NS bolus and benadryl given and effective, pt slept well overnight. Makes needs known.

## 2025-05-25 NOTE — PROGRESS NOTES
[facility]    Medicine Progress Note - Hospitalist Service    Date of Admission:  5/24/2025    Assessment & Plan     34 year old female with a past medical history of hypertension, asthma, mood disorder presented to the hospital with complaint of nausea associated with vomiting and diarrhea for the past 3 days after eating takeout. There was concern for acute gastroenteritis, abnl Ua, dehydration and MARYURI    1.Acute gastroenteritis  -on stool cx Astrovirus and ETEC  -would hold antibiotics  -ivf  -supportive treatment  -improving, see if she can advance diet    2.Abnl UA  -no dysuria now  -wait for UC    3.Dehydration with MARYURI  -IVF  -needs ivf another day    4.Overnight tingling lips after apple juice  -benadryl helped  -will list apple as allergy  -at risk with asthma    5.hx of asthma    6.PreDM  -A1c 5.7  -will need follow up with pcp  -will ask nutrition to see for diet recs    7.HTN  -held hydrochlorothiazide and ARB with MARYURI  -bp still low nl  -also on labetolol     8.Mood d/o   - BuSpar    9.Hypokalemia  -replace  -mag ok                 Diet: Combination Diet Clear Liquid    DVT Prophylaxis: Pneumatic Compression Devices  Bryant Catheter: Not present  Lines: None     Cardiac Monitoring: None  Code Status: Full Code      Clinically Significant Risk Factors Present on Admission                   # Hypertension: Home medication list includes antihypertensive(s)           # Morbid Obesity: Estimated body mass index is 43.61 kg/m  as calculated from the following:    Height as of this encounter: 1.524 m (5').    Weight as of this encounter: 101.3 kg (223 lb 4.8 oz).              Social Drivers of Health            Disposition Plan     Medically Ready for Discharge: Anticipated Tomorrow             Kelly Zaragoza MD  Hospitalist Service  [facility]  Securely message with [employer] (more info)  Text page via [employer] Paging/Directory    ______________________________________________________________________    Interval History   She notes loose stool this am, no blood  Slight dizzy when got up, but feels better  Wants to try to eat  No dysuria now  Notes tingling lips overnight after apple juice        Physical Exam   Vital Signs: Temp: 97.6  F (36.4  C) Temp src: Oral BP: 100/53 Pulse: 82   Resp: 20 SpO2: 92 % O2 Device: None (Room air)    Weight: 223 lbs 4.8 oz    Constitutional: awake, alert, cooperative, and no apparent distress  Eyes: sclera clear  Respiratory: no increased work of breathing, good air exchange, no retractions, and clear to auscultation  Cardiovascular: regular rate and rhythm and normal S1 and S2  GI: normal bowel sounds, soft, non-distended, and non-tender  Skin: no bruising or bleeding  Musculoskeletal: no lower extremity pitting edema present  Neurologic: Mental Status Exam:  Level of Alertness:   awake  Neuropsychiatric: General: normal, calm, and normal eye contact    Medical Decision Making       35 MINUTES SPENT BY ME on the date of service doing chart review, history, exam, documentation & further activities per the note.      Data     I have personally reviewed the following data over the past 24 hrs:    5.7  \   12.9   / 209     137 102 28.8 (H) /  82   3.7 20 (L) 1.99 (H) \     ALT: 31 AST: 25 AP: 116 TBILI: 1.0   ALB: 4.4 TOT PROTEIN: 8.3 LIPASE: 38     Trop: 9 BNP: N/A     TSH: N/A T4: N/A A1C: 5.7 (H)       Imaging results reviewed over the past 24 hrs:   Recent Results (from the past 24 hours)   Chest XR,  PA & LAT    Narrative    EXAM: XR CHEST 2 VIEWS  LOCATION: [facility]  DATE: 5/24/2025    INDICATION: chest heaviness intermittently  COMPARISON: 4/25/2025.      Impression    IMPRESSION: Negative chest.

## 2025-05-25 NOTE — PLAN OF CARE
Quiet this shift. Denies pain. Tolerated  clear liquid breakfast. Had potato soup and yogurt at 1030 am. Did not receive a lunch tray- states she will order when she gets up to her room (will be going to room 405 when room is ready)  no stools, no vomiting, no fevers. Up independently in room. No visitors, talking with family on cell phone intermittently throughout the shift. Keri Alvarez RN        Problem: Nausea and Vomiting  Goal: Nausea and Vomiting Relief  Outcome: Progressing  Intervention: Prevent and Manage Nausea and Vomiting  Recent Flowsheet Documentation  Taken 5/25/2025 1200 by Keri Alvarez RN  Nausea/Vomiting Interventions: antiemetic  Taken 5/25/2025 0800 by Keri Alvarez RN  Nausea/Vomiting Interventions: sips of clear liquids given     Problem: Pain Acute  Goal: Optimal Pain Control and Function  Outcome: Progressing

## 2025-05-25 NOTE — PROGRESS NOTES
Resident paged over low BP, pt reported lightheadedness and oral swelling. NS Bolus given + benadryl.

## 2025-05-25 NOTE — H&P
[facility]    History and Physical - Hospitalist Service       Date of Admission:  5/24/2025    Assessment & Plan    Assessment:  Katina Montlila is a 34 year old female with a past medical history of hypertension, asthma, mood disorder presented to the hospital with complaint of nausea associated with vomiting and diarrhea for the past 3 days after eating takeout, also appears to have a urinary tract infection, enteric bacterial virus panel and stool C. difficile ordered, patient received famotidine, Zofran, fluid boluses in the ED, started on broad-spectrum antibiotics, and is going to be admitted for food poisoning, MARYURI, dehydration and UTI.    Problem list and plan:  Food poisoning  Hypovolemia and dehydration  Patient presented to the hospital with complaint of nausea associated with vomiting and diarrhea for the past 3 days after eating takeout  Patient not able to keep anything down  In the ER vitals fairly within normal limits other than soft blood pressure  Enteric bacterial virus panel and stool C. difficile ordered Patient received famotidine, Zofran, fluid boluses in the ED Continue gentle IV hydration  Started on ceftriaxone and Flagyl  Follow-up stool C. difficile and enteric bacterial virus panel    Urinary tract infection  Urine appears to be grossly infected, urine cultures pending  Started on ceftriaxone    MARYURI most likely prerenal  Baseline creatinine below 1  Current creatinine 1.9  Gentle IV hydration  Monitor renal function closely  If renal function fails to resolve or worsen may need to consult nephrology    Mild hyperglycemia secondary to prediabetes  Hemoglobin A1c 5.7  Monitor blood sugar level intermittently    History of hypertension  Was initially hypotensive, blood pressure subsequently improved after fluid resuscitation  We will be holding losartan hydrochlorothiazide for now for MARYURI and dehydration  Continue with labetalol  Monitor vital signs as per  protocol    History of asthma  Continue with bronchodilators as appropriate    History of mood disorder  Continue with BuSpar    DVT PPx  Intermittent pneumatic compression    CODE STATUS  Full code as per discussion with the patient        Diet: Combination Diet Clear Liquid    DVT Prophylaxis: Pneumatic Compression Devices  Bryant Catheter: Not present  Lines: None     Cardiac Monitoring: None  Code Status: Full Code    Mental status: Patient is alert awake and oriented x 3, patient is a good Historian and most of the history was obtained from the patient, some history was obtained from chart review and the rest of the history was obtained from discussion with the ER physician  Clinically Significant Risk Factors Present on Admission                   # Hypertension: Home medication list includes antihypertensive(s)           # Morbid Obesity: Estimated body mass index is 43.61 kg/m  as calculated from the following:    Height as of this encounter: 1.524 m (5').    Weight as of this encounter: 101.3 kg (223 lb 4.8 oz).              Disposition Plan     Medically Ready for Discharge: Anticipated in 2-4 Days     Saad J. Gondal, MD  Hospitalist Service  [facility]  Securely message with [employer] (more info)  Text page via [employer] Paging/Directory     ______________________________________________________________________    Chief Complaint   Nausea associated with vomiting and diarrhea    History is obtained from the patient and chart review    History of Present Illness   Katina Montilla is a 34 year old female with a past medical history of hypertension, asthma, mood disorder presented to the hospital with complaint of nausea associated with vomiting and diarrhea for the past 3 days after eating takeout, patient not able to keep anything down, in the ER vitals fairly within normal limits other than soft blood pressure, labs significant for anion gap metabolic acidosis, MARYURI with a creatinine of 1.99 and  a baseline below 1, mild hyperglycemia, urine appears to be infected, urine cultures pending, enteric bacterial virus panel and stool C. difficile ordered, chest x-ray negative for acute process, patient received famotidine, Zofran, fluid boluses in the ED and is going to be admitted for food poisoning, MARYURI, dehydration and UTI.      Past Medical History    No past medical history on file.    Past Surgical History   No past surgical history on file.    Prior to Admission Medications   Prior to Admission Medications   Prescriptions Last Dose Informant Patient Reported? Taking?   EPINEPHrine (ANY BX GENERIC EQUIV) 0.3 MG/0.3ML injection 2-pack   No Yes   Sig: Inject 0.3 mLs (0.3 mg) into the muscle once as needed for anaphylaxis. May repeat one time in 5-15 minutes if response to initial dose is inadequate.   albuterol (PROAIR HFA/PROVENTIL HFA/VENTOLIN HFA) 108 (90 Base) MCG/ACT inhaler   Yes Yes   Sig: Inhale 1-2 puffs into the lungs every 4 hours as needed for wheezing.   busPIRone (BUSPAR) 5 MG tablet   Yes Yes   Sig: Take 5 mg by mouth 3 times daily as needed (agitation or anxiety).   labetalol (NORMODYNE) 200 MG tablet 5/24/2025 Morning  Yes Yes   Sig: Take 800 mg by mouth 2 times daily.   losartan-hydrochlorothiazide (HYZAAR) 50-12.5 MG tablet 5/24/2025 Morning  Yes Yes   Sig: Take 1 tablet by mouth daily.   triamcinolone (ARISTOCORT HP) 0.5 % external cream   Yes Yes   Sig: Apply topically 3 times daily as needed for irritation (Elbows).      Facility-Administered Medications: None        Review of Systems    The 10 point Review of Systems is negative other than noted in the HPI or here.  Nausea, vomiting, diarrhea    Physical Exam   Vital Signs: Temp: 98  F (36.7  C) Temp src: Oral BP: 135/67 Pulse: 83   Resp: 20 SpO2: 98 % O2 Device: None (Room air)    Weight: 223 lbs 4.8 oz    GENERAL: Patient was seen and examined at bedside with no acute distress  HENT:  Head is normocephalic, atraumatic.   EYES:  Eyes  have anicteric sclerae without conjunctival injection   LUNGS: Bilateral air entry, clear to auscultation bilaterally  CARDIOVASCULAR:  Regular rate and rhythm with no murmurs, gallops or rubs.  ABDOMEN:  Normal bowel sounds. Soft; nontender   EXT: no edema    SKIN:  No acute rashes.   NEUROLOGIC:  Grossly nonfocal.      Medical Decision Making       80 MINUTES SPENT BY ME on the date of service doing chart review, history, exam, documentation & further activities per the note.      Data     I have personally reviewed the following data over the past 24 hrs:    8.0  \   15.7   / 301     137 102 28.8 (H) /  117 (H)   3.7 20 (L) 1.99 (H) \     ALT: 31 AST: 25 AP: 116 TBILI: 1.0   ALB: 4.4 TOT PROTEIN: 8.3 LIPASE: 38     Trop: 9 BNP: N/A     TSH: N/A T4: N/A A1C: 5.7 (H)       Imaging results reviewed over the past 24 hrs:   Recent Results (from the past 24 hours)   Chest XR,  PA & LAT    Narrative    EXAM: XR CHEST 2 VIEWS  LOCATION: [facility]  DATE: 5/24/2025    INDICATION: chest heaviness intermittently  COMPARISON: 4/25/2025.      Impression    IMPRESSION: Negative chest.

## 2025-05-25 NOTE — ED NOTES
Bed: JNED-32  Expected date: 5/24/25  Expected time:   Means of arrival:   Comments:  SAEED RodríguezH

## 2025-05-25 NOTE — PHARMACY-ADMISSION MEDICATION HISTORY
Pharmacy Intern Admission Medication History    Admission medication history is complete. The information provided in this note is only as accurate as the sources available at the time of the update.    Information Source(s): Patient and CareEverywhere/SureScripts via in-person    Pertinent Information:     Changes made to PTA medication list:  Added: buspirone, triamcinolone, losartan-hydrochlorothiazide, albuterol, labetalol  Deleted: Hydroxyzine, prednisone  Changed: None    Allergies reviewed with patient and updates made in EHR: yes    Medication History Completed By: DEVONTE NELSON 5/24/2025 8:01 PM    PTA Med List   Medication Sig Last Dose/Taking    albuterol (PROAIR HFA/PROVENTIL HFA/VENTOLIN HFA) 108 (90 Base) MCG/ACT inhaler Inhale 1-2 puffs into the lungs every 4 hours as needed for wheezing. Taking As Needed    busPIRone (BUSPAR) 5 MG tablet Take 5 mg by mouth 3 times daily as needed (agitation or anxiety). Taking As Needed    EPINEPHrine (ANY BX GENERIC EQUIV) 0.3 MG/0.3ML injection 2-pack Inject 0.3 mLs (0.3 mg) into the muscle once as needed for anaphylaxis. May repeat one time in 5-15 minutes if response to initial dose is inadequate. Taking As Needed    labetalol (NORMODYNE) 200 MG tablet Take 800 mg by mouth 2 times daily. 5/24/2025 Morning    losartan-hydrochlorothiazide (HYZAAR) 50-12.5 MG tablet Take 1 tablet by mouth daily. 5/24/2025 Morning    triamcinolone (ARISTOCORT HP) 0.5 % external cream Apply topically 3 times daily as needed for irritation (Elbows). Taking As Needed

## 2025-05-25 NOTE — PLAN OF CARE
Transferring to 405. Report called to Elvis YEUNG. All belongings sent. Katina will notify her family of room change. Keri Alvarez RN

## 2025-05-26 VITALS
SYSTOLIC BLOOD PRESSURE: 138 MMHG | DIASTOLIC BLOOD PRESSURE: 98 MMHG | HEART RATE: 67 BPM | BODY MASS INDEX: 43.11 KG/M2 | TEMPERATURE: 98 F | RESPIRATION RATE: 20 BRPM | OXYGEN SATURATION: 97 % | WEIGHT: 219.58 LBS | HEIGHT: 60 IN

## 2025-05-26 LAB
ANION GAP SERPL CALCULATED.3IONS-SCNC: 5 MMOL/L (ref 7–15)
ATRIAL RATE - MUSE: 66 BPM
BACTERIA UR CULT: NORMAL
BUN SERPL-MCNC: 12.5 MG/DL (ref 6–20)
CALCIUM SERPL-MCNC: 8.4 MG/DL (ref 8.8–10.4)
CHLORIDE SERPL-SCNC: 115 MMOL/L (ref 98–107)
CREAT SERPL-MCNC: 0.72 MG/DL (ref 0.51–0.95)
DIASTOLIC BLOOD PRESSURE - MUSE: NORMAL MMHG
EGFRCR SERPLBLD CKD-EPI 2021: >90 ML/MIN/1.73M2
ERYTHROCYTE [DISTWIDTH] IN BLOOD BY AUTOMATED COUNT: 15.4 % (ref 10–15)
GLUCOSE BLDC GLUCOMTR-MCNC: 84 MG/DL (ref 70–99)
GLUCOSE SERPL-MCNC: 84 MG/DL (ref 70–99)
HCO3 SERPL-SCNC: 22 MMOL/L (ref 22–29)
HCT VFR BLD AUTO: 35.9 % (ref 35–47)
HGB BLD-MCNC: 11.9 G/DL (ref 11.7–15.7)
INTERPRETATION ECG - MUSE: NORMAL
MAGNESIUM SERPL-MCNC: 2 MG/DL (ref 1.7–2.3)
MCH RBC QN AUTO: 26.4 PG (ref 26.5–33)
MCHC RBC AUTO-ENTMCNC: 33.1 G/DL (ref 31.5–36.5)
MCV RBC AUTO: 80 FL (ref 78–100)
P AXIS - MUSE: 35 DEGREES
PHOSPHATE SERPL-MCNC: 2.3 MG/DL (ref 2.5–4.5)
PLATELET # BLD AUTO: 217 10E3/UL (ref 150–450)
POTASSIUM SERPL-SCNC: 3.9 MMOL/L (ref 3.4–5.3)
PR INTERVAL - MUSE: 150 MS
QRS DURATION - MUSE: 102 MS
QT - MUSE: 456 MS
QTC - MUSE: 478 MS
R AXIS - MUSE: 34 DEGREES
RBC # BLD AUTO: 4.51 10E6/UL (ref 3.8–5.2)
SODIUM SERPL-SCNC: 142 MMOL/L (ref 135–145)
SYSTOLIC BLOOD PRESSURE - MUSE: NORMAL MMHG
T AXIS - MUSE: -4 DEGREES
VENTRICULAR RATE- MUSE: 66 BPM
WBC # BLD AUTO: 5.2 10E3/UL (ref 4–11)

## 2025-05-26 PROCEDURE — 36415 COLL VENOUS BLD VENIPUNCTURE: CPT | Performed by: INTERNAL MEDICINE

## 2025-05-26 PROCEDURE — 250N000013 HC RX MED GY IP 250 OP 250 PS 637: Performed by: INTERNAL MEDICINE

## 2025-05-26 PROCEDURE — 84100 ASSAY OF PHOSPHORUS: CPT | Performed by: INTERNAL MEDICINE

## 2025-05-26 PROCEDURE — 83735 ASSAY OF MAGNESIUM: CPT | Performed by: INTERNAL MEDICINE

## 2025-05-26 PROCEDURE — 93010 ELECTROCARDIOGRAM REPORT: CPT | Performed by: INTERNAL MEDICINE

## 2025-05-26 PROCEDURE — 85027 COMPLETE CBC AUTOMATED: CPT | Performed by: INTERNAL MEDICINE

## 2025-05-26 PROCEDURE — 99239 HOSP IP/OBS DSCHRG MGMT >30: CPT | Performed by: INTERNAL MEDICINE

## 2025-05-26 PROCEDURE — 80048 BASIC METABOLIC PNL TOTAL CA: CPT | Performed by: INTERNAL MEDICINE

## 2025-05-26 PROCEDURE — 93005 ELECTROCARDIOGRAM TRACING: CPT

## 2025-05-26 RX ORDER — LABETALOL 200 MG/1
400 TABLET, FILM COATED ORAL 2 TIMES DAILY
Status: SHIPPED
Start: 2025-05-26

## 2025-05-26 RX ORDER — FAMOTIDINE 10 MG
10 TABLET ORAL 2 TIMES DAILY
Status: DISCONTINUED | OUTPATIENT
Start: 2025-05-26 | End: 2025-05-26 | Stop reason: HOSPADM

## 2025-05-26 RX ADMIN — FAMOTIDINE 10 MG: 10 TABLET ORAL at 08:31

## 2025-05-26 RX ADMIN — POTASSIUM & SODIUM PHOSPHATES POWDER PACK 280-160-250 MG 1 PACKET: 280-160-250 PACK at 08:31

## 2025-05-26 ASSESSMENT — ACTIVITIES OF DAILY LIVING (ADL)
ADLS_ACUITY_SCORE: 29

## 2025-05-26 NOTE — DISCHARGE SUMMARY
[facility]  Hospitalist Discharge Summary      Date of Admission:  5/24/2025  Date of Discharge:  5/26/2025  Discharging Provider: Ronald Mehta DO,   Discharge Service: Hospitalist Service    Discharge Diagnoses       Clinically Significant Risk Factors     # Morbid Obesity: Estimated body mass index is 42.88 kg/m  as calculated from the following:    Height as of this encounter: 1.524 m (5').    Weight as of this encounter: 99.6 kg (219 lb 9.3 oz).       Follow-ups Needed After Discharge   Follow-up Appointments       Hospital Follow-up with Existing Primary Care Provider (PCP)      Keep scheduled appointment this wednesday    Schedule Primary Care visit within: 14 Days               Unresulted Labs Ordered in the Past 30 Days of this Admission       No orders found from 4/24/2025 to 5/25/2025.            Discharge Disposition   Discharged to home  Condition at discharge: Stable    Hospital Course     34 year old female with a past medical history of hypertension, asthma, mood disorder presented to the hospital with complaint of nausea associated with vomiting and diarrhea for the past 3 days after eating takeout. There was concern for acute gastroenteritis, abnl Ua, dehydration and MARYURI     1.Acute gastroenteritis with n/v/d, resolved  -on stool cx Astrovirus and ETEC  -resolved with supportive treatment  -Good PO intake over past 24hrs     2.Abnl UA  - UC negative     3.Dehydration with MARYURI  - resolved with IVF     4.Overnight tingling lips after apple juice, resolved  -will list apple as allergy    5.hx of asthma     6.PreDM  -A1c 5.7  -follow up with pcp     7.HTN  -hold hydrochlorothiazide and ARB on discharge due to current BP trends.  --Labetaolol dose reduced to max dose of 400mg BID as opposed to 800mg BID  -BP monitoring at home as instructed  -PCP follow-up as scheduled     8.Mood d/o   - BuSpar     9.Hypokalemia  -replaced    Consultations This Hospital Stay   NUTRITION  SERVICES ADULT IP CONSULT    Code Status   Full Code    Time Spent on this Encounter   I, Ronald Mehta DO, DO, personally saw the patient today and spent greater than 30 minutes discharging this patient.       Ronald Mehta DO, DO  [address] [address]  Phone: [phone]  Fax: [phone]  ______________________________________________________________________    Physical Exam   Vital Signs: Temp: 98  F (36.7  C) Temp src: Oral BP: (!) 138/98 Pulse: 67   Resp: 20 SpO2: 97 % O2 Device: None (Room air)    Weight: 219 lbs 9.25 oz    Gen nad  Cv rrr  Lungs ctab  Abd bs+, nd       [facility]   [facility]    Discharge Orders      Reason for your hospital stay    Infectious diarrhea, MARYURI, dehydration.     Activity    Your activity upon discharge: activity as tolerated     Monitor and record    Blood pressure: daily as instructed.     Diet    Follow this diet upon discharge: Current Diet:Orders Placed This Encounter      Regular diet     Hospital Follow-up with Existing Primary Care Provider (PCP)    Keep scheduled appointment this wednesday       Significant Results and Procedures       Discharge Medications   Current Discharge Medication List        CONTINUE these medications which have CHANGED    Details   labetalol (NORMODYNE) 200 MG tablet Take 2 tablets (400 mg) by mouth 2 times daily.    Associated Diagnoses: Benign essential hypertension           CONTINUE these medications which have NOT CHANGED    Details   albuterol (PROAIR HFA/PROVENTIL HFA/VENTOLIN HFA) 108 (90 Base) MCG/ACT inhaler Inhale 1-2 puffs into the lungs every 4 hours as needed for wheezing.      busPIRone (BUSPAR) 5 MG tablet Take 5 mg by mouth 3 times daily as needed (agitation or anxiety).      EPINEPHrine (ANY BX GENERIC EQUIV) 0.3 MG/0.3ML injection 2-pack Inject 0.3 mLs (0.3 mg) into the muscle once as needed for anaphylaxis. May repeat one time  in 5-15 minutes if response to initial dose is inadequate.  Qty: 2 each, Refills: 1      triamcinolone (ARISTOCORT HP) 0.5 % external cream Apply topically 3 times daily as needed for irritation (Elbows).           STOP taking these medications       losartan-hydrochlorothiazide (HYZAAR) 50-12.5 MG tablet Comments:   Reason for Stopping:             Allergies   Allergies   Allergen Reactions    Apple Juice      Tingle lips    Nifedipine Headache    Hydralazine Rash     IV only

## 2025-05-26 NOTE — PLAN OF CARE
"Problem: Adult Inpatient Plan of Care  Goal: Plan of Care Review  Description: The Plan of Care Review/Shift note should be completed every shift.  The Outcome Evaluation is a brief statement about your assessment that the patient is improving, declining, or no change.  This information will be displayed automatically on your shift  note.  Outcome: Progressing  Goal: Patient-Specific Goal (Individualized)  Description: You can add care plan individualizations to a care plan. Examples of Individualization might be:  \"Parent requests to be called daily at 9am for status\", \"I have a hard time hearing out of my right ear\", or \"Do not touch me to wake me up as it startles  me\".  Outcome: Progressing  Goal: Absence of Hospital-Acquired Illness or Injury  Outcome: Progressing  Intervention: Identify and Manage Fall Risk  Recent Flowsheet Documentation  Taken 5/25/2025 1739 by Rick Koo, RN  Safety Promotion/Fall Prevention:   assistive device/personal items within reach   clutter free environment maintained   lighting adjusted   nonskid shoes/slippers when out of bed   patient and family education   safety round/check completed  Goal: Optimal Comfort and Wellbeing  Outcome: Progressing  Goal: Readiness for Transition of Care  Outcome: Progressing  Intervention: Mutually Develop Transition Plan  Recent Flowsheet Documentation  Taken 5/25/2025 2027 by Rick Koo, RN  Equipment Currently Used at Home: none     Problem: Nausea and Vomiting  Goal: Nausea and Vomiting Relief  Outcome: Progressing     Problem: Pain Acute  Goal: Optimal Pain Control and Function  Outcome: Progressing     Problem: Comorbidity Management  Goal: Blood Glucose Levels Within Targeted Range  Outcome: Progressing  Goal: Blood Pressure in Desired Range  Outcome: Progressing    Patient alert and oriented. Denied pain thus far. No nausea, dizziness, or lightheadedness thus far. IV NS infusing continuously at 75 mL/hr. Patient independent with " transfers and cares.

## 2025-05-26 NOTE — PROGRESS NOTES
Discussed discharge paperwork with pt, pt voiced understanding. Personal belongings sent with pt. Pt discharged via w/c accompanied by aide to meet spouse at entrance of hospital for transport home.

## 2025-05-26 NOTE — PLAN OF CARE
"  Problem: Adult Inpatient Plan of Care  Goal: Plan of Care Review  Description: The Plan of Care Review/Shift note should be completed every shift.  The Outcome Evaluation is a brief statement about your assessment that the patient is improving, declining, or no change.  This information will be displayed automatically on your shift  note.  Outcome: Progressing  Goal: Patient-Specific Goal (Individualized)  Description: You can add care plan individualizations to a care plan. Examples of Individualization might be:  \"Parent requests to be called daily at 9am for status\", \"I have a hard time hearing out of my right ear\", or \"Do not touch me to wake me up as it startles  me\".  Outcome: Progressing  Goal: Absence of Hospital-Acquired Illness or Injury  Outcome: Progressing  Intervention: Identify and Manage Fall Risk  Recent Flowsheet Documentation  Taken 5/26/2025 0005 by Cindy Carlson, RN  Safety Promotion/Fall Prevention:   assistive device/personal items within reach   clutter free environment maintained   lighting adjusted   nonskid shoes/slippers when out of bed   patient and family education   safety round/check completed  Intervention: Prevent Skin Injury  Recent Flowsheet Documentation  Taken 5/26/2025 0005 by Cindy Carlson, RN  Body Position: position changed independently  Goal: Optimal Comfort and Wellbeing  Outcome: Progressing  Goal: Readiness for Transition of Care  Outcome: Progressing   Goal Outcome Evaluation:             Pt a/o with VSS on room air. Pt denies pain or nausea but is having diarrhea. Pt up independently in room.                "

## 2025-05-28 ENCOUNTER — PATIENT OUTREACH (OUTPATIENT)
Dept: CARE COORDINATION | Facility: CLINIC | Age: 35
End: 2025-05-28
Payer: COMMERCIAL

## 2025-05-28 NOTE — PROGRESS NOTES
Connected Care Resource Center:   [facility] Contact  [facility]/Voicemail     Clinical Data: Post-Discharge Outreach     Outreach attempted x 2.  Left message on patient's voicemail, providing [facility]'s central phone number of [phone] ([phone]) for questions/concerns and/or to schedule an appt with an [facility] provider, if they do not have a PCP.      Plan:  [facility] will do no further outreaches at this time.       Cristy Ackerman MA  Connected Care Resource Center, [facility]    *Connected Care Resource Team does NOT follow patient ongoing. Referrals are identified based on internal discharge reports and the outreach is to ensure patient has an understanding of their discharge instructions.

## 2025-06-04 ENCOUNTER — RESULTS FOLLOW-UP (OUTPATIENT)
Dept: FAMILY MEDICINE | Facility: CLINIC | Age: 35
End: 2025-06-04

## 2025-06-04 LAB
ADV 40+41 DNA STL QL NAA+NON-PROBE: NEGATIVE
ASTRO TYP 1-8 RNA STL QL NAA+NON-PROBE: POSITIVE
C CAYETANENSIS DNA STL QL NAA+NON-PROBE: NEGATIVE
CAMPYLOBACTER DNA SPEC NAA+PROBE: NEGATIVE
CRYPTOSP DNA STL QL NAA+NON-PROBE: NEGATIVE
E COLI O157 DNA STL QL NAA+NON-PROBE: ABNORMAL
E HISTOLYT DNA STL QL NAA+NON-PROBE: NEGATIVE
EAEC ASTA GENE ISLT QL NAA+PROBE: NEGATIVE
EC STX1+STX2 GENES STL QL NAA+NON-PROBE: NEGATIVE
EPEC EAE GENE STL QL NAA+NON-PROBE: NEGATIVE
ETEC LTA+ST1A+ST1B TOX ST NAA+NON-PROBE: POSITIVE
G LAMBLIA DNA STL QL NAA+NON-PROBE: NEGATIVE
NOROVIRUS GI+II RNA STL QL NAA+NON-PROBE: NEGATIVE
P SHIGELLOIDES DNA STL QL NAA+NON-PROBE: NEGATIVE
RVA RNA STL QL NAA+NON-PROBE: NEGATIVE
SALMONELLA SP RPOD STL QL NAA+PROBE: NEGATIVE
SAPO I+II+IV+V RNA STL QL NAA+NON-PROBE: NEGATIVE
SHIGELLA SP+EIEC IPAH ST NAA+NON-PROBE: NEGATIVE
V CHOLERAE DNA SPEC QL NAA+PROBE: NEGATIVE
VIBRIO DNA SPEC NAA+PROBE: NEGATIVE
Y ENTEROCOL DNA STL QL NAA+PROBE: NEGATIVE

## 2025-06-19 ENCOUNTER — LAB (OUTPATIENT)
Dept: LAB | Facility: CLINIC | Age: 35
End: 2025-06-19
Payer: COMMERCIAL

## 2025-06-19 ENCOUNTER — OFFICE VISIT (OUTPATIENT)
Dept: ALLERGY | Facility: CLINIC | Age: 35
End: 2025-06-19
Payer: COMMERCIAL

## 2025-06-19 VITALS
HEART RATE: 72 BPM | RESPIRATION RATE: 18 BRPM | OXYGEN SATURATION: 98 % | BODY MASS INDEX: 43.11 KG/M2 | HEIGHT: 60 IN | WEIGHT: 219.58 LBS

## 2025-06-19 DIAGNOSIS — T78.2XXA ANAPHYLAXIS, INITIAL ENCOUNTER: Primary | ICD-10-CM

## 2025-06-19 DIAGNOSIS — T78.1XXA ADVERSE FOOD REACTION, INITIAL ENCOUNTER: ICD-10-CM

## 2025-06-19 DIAGNOSIS — T78.2XXA ANAPHYLAXIS, INITIAL ENCOUNTER: ICD-10-CM

## 2025-06-19 PROCEDURE — 82785 ASSAY OF IGE: CPT

## 2025-06-19 PROCEDURE — 86003 ALLG SPEC IGE CRUDE XTRC EA: CPT

## 2025-06-19 PROCEDURE — 36415 COLL VENOUS BLD VENIPUNCTURE: CPT

## 2025-06-19 PROCEDURE — 86008 ALLG SPEC IGE RECOMB EA: CPT | Mod: 59

## 2025-06-19 RX ORDER — COPPER 313.4 MG/1
1 INTRAUTERINE DEVICE INTRAUTERINE ONCE
COMMUNITY
Start: 2024-11-25

## 2025-06-19 RX ORDER — LOSARTAN POTASSIUM 50 MG/1
50 TABLET ORAL DAILY
COMMUNITY
Start: 2025-06-13

## 2025-06-19 RX ORDER — CLOBETASOL PROPIONATE 0.5 MG/G
CREAM TOPICAL 2 TIMES DAILY
COMMUNITY
Start: 2025-06-13

## 2025-06-19 NOTE — PATIENT INSTRUCTIONS
Oral Allergy Syndrome    Avoid foods that cause difficulty in the raw form    Avoid tree nut/pine nuts for now    Epinephrine (Neffy)    Xolair?

## 2025-06-19 NOTE — LETTER
ANAPHYLAXIS ALLERGY PLAN    Name: Katina Montilla      :  1990    Allergy to:      Weight: 219 lbs 9.3 oz           Asthma:  No      Do not depend on antihistamines or inhalers (bronchodilators) to treat a severe reaction; USE EPINEPHRINE      MEDICATIONS/DOSES  Epinephrine:    Epinephrine dose:  0.3 mg IM  Antihistamine:  Zyrtec (Cetirizine)  Antihistamine dose:  10 mg        ANAPHYLAXIS ALLERGY PLAN (Page 2)  Patient:  Katina Montilla  :  1990         Electronically signed on 2025 by:  Rhonda Lee MD  Parent/Guardian Authorization Signature:  ___________________________ Date:    FORM PROVIDED [employer] (FARE) ([email or web address]) 2017

## 2025-06-19 NOTE — LETTER
6/19/2025      Katina Montilla  [address]  [place] [address]      Dear Colleague,    Thank you for referring your patient, Katina Montilla, to the [facility]. Please see a copy of my visit note below.          Subjective  Katina is a 35 year old, presenting for the following health issues:  No chief complaint on file.    HPI    Chief complaint: Allergic reaction    History of present illness: This is a pleasant 35-year-old woman that I was asked to see for evaluation of allergic reaction by Dr. Chase.  Patient states in March she had hummus with pine nuts and an apple at work.  She states shortly after she felt it was difficult to breathe had itchy scalp itchy palms.  She stated that she felt her throat was closing.  She had difficulty talking.  She was taken to the emergency room where she was treated for an anaphylactic reaction with epinephrine Benadryl steroids and Pepcid.  She states symptoms subsequently resolved.  She has never had a reaction like this before but she notes when she eats almonds or cashews she will have a cough that develops and stays for a few weeks.  She states she will noted itchy mouth and throat with peanuts as well.  She does eat pistachios without any symptoms.  She avoids walnuts pecans and hazelnuts.  She states she noticed recently when she had apple juice she had itchy mouth and throat and swollen lips as well.  She states that this does not happen with other foods.  She does not think she has a history of nasal allergies but does have a history of asthma.  She did not take any over-the-counter medications the day prior to the reaction.  Nothing else unusual about the day.  She states she does not eat pine nuts frequently but has had sesame since the reaction.    Past medical history: Hypertension, appendectomy, tonsillectomy    Social history: She stays at home with her son, no pets at home, former smoker    Family history: Father has latex allergy             Objective   Pulse 72   Resp 18   Ht 1.524 m (5')   Wt 99.6 kg (219 lb 9.3 oz)   LMP 05/12/2025 (Approximate)   SpO2 98%   BMI 42.88 kg/m    Body mass index is 42.88 kg/m .  Physical Exam     Gen: Pleasant female not in acute distress  HEENT: Eyes no erythema of the bulbar or palpebral conjunctiva, no edema. Ears: No deformities or lesions. Nose: No congestion,  Mouth: Throat clear, no lip or tongue edema.   Neck: No masses lesions or swelling  Respiratory: No coughing with breathing, no retractions  Lymph: No visible supraclavicular or cervical lymphadenopathy  Skin: No rashes or lesions  Psych: Alert and appropriate for age    Impression report and plan:  1.  Anaphylactic reaction to pine nut  2.  Likely oral allergy syndrome    Recommend specific IgE to peanut as well as the tree nuts including component testing and peanut.  Suspect she has a component of oral allergy syndrome.  For this reason we will check specific IgE to grass pollen birch mugwort and ragweed pollens.  She has a current epinephrine device.  Patient does take labetalol.  For this reason we will have her carry a glucagon kit as well and instructed her how to use this properly.  Anaphylaxis action plan updated reviewed.  Discussed Neffy as well as Xolair with patient including risk of anaphylaxis with Xolair.  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              Signed Electronically by: Rhonda Lee MD      Again, thank you for allowing me to participate in the care of your patient.        Sincerely,        Rhonda Lee MD    Electronically signed

## 2025-06-20 LAB
ALLERGEN WALNUT RJUG R 1 IGE: <0.1 KU(A)/L
ALMOND IGE QN: <0.1 KU(A)/L
CASHEW NUT IGE QN: <0.1 KU(A)/L
COMMON RAGWEED IGE QN: <0.1 KU(A)/L
COR A 14 STORAGE PROTEIN 2S HAZELNUT: <0.1 KU(A)/L
ENGLISH WALNUT (RJUG R) 3 IGE QN: <0.1 KU(A)/L
HAZELNUT (NCOR A) 9 IGE QN: <0.1 KU(A)/L
HAZELNUT (RCOR A) 1 IGE QN: <0.1 KU(A)/L
HAZELNUT (RCOR A) 8 IGE QN: <0.1 KU(A)/L
IGE SERPL-ACNC: 206 KU/L (ref 0–114)
JOHNSON GRASS IGE QN: <0.1 KU(A)/L
MUGWORT IGE QN: <0.1 KU(A)/L
PEANUT (RARA H) 1 IGE QN: <0.1 KU(A)/L
PEANUT (RARA H) 2 IGE QN: <0.1 KU(A)/L
PEANUT (RARA H) 3 IGE QN: <0.1 KU(A)/L
PEANUT (RARA H) 6 IGE QN: <0.1 KU(A)/L
PEANUT (RARA H) 8 IGE QN: <0.1 KU(A)/L
PEANUT (RARA H) 9 IGE QN: <0.1 KU(A)/L
PEANUT IGE QN: <0.1 KU(A)/L
PECAN/HICK NUT IGE QN: <0.1 KU(A)/L
PINE NUT IGE QN: 14.2 KU(A)/L
SILVER BIRCH IGE QN: <0.1 KU(A)/L
TIMOTHY IGE QN: <0.1 KU(A)/L
WALNUT IGE QN: <0.1 KU(A)/L

## 2025-07-10 DIAGNOSIS — T78.40XA ALLERGIC REACTION, INITIAL ENCOUNTER: Primary | ICD-10-CM
